# Patient Record
Sex: FEMALE | Race: WHITE | Employment: PART TIME | URBAN - METROPOLITAN AREA
[De-identification: names, ages, dates, MRNs, and addresses within clinical notes are randomized per-mention and may not be internally consistent; named-entity substitution may affect disease eponyms.]

---

## 2019-10-31 PROBLEM — Z00.00 ROUTINE ADULT HEALTH MAINTENANCE: Status: ACTIVE | Noted: 2017-02-14

## 2019-10-31 PROBLEM — L65.9 HAIR LOSS: Status: ACTIVE | Noted: 2018-07-30

## 2019-10-31 PROBLEM — R30.0 DYSURIA: Status: RESOLVED | Noted: 2017-02-14 | Resolved: 2019-10-31

## 2019-10-31 PROBLEM — Z00.00 ROUTINE ADULT HEALTH MAINTENANCE: Status: RESOLVED | Noted: 2017-02-14 | Resolved: 2019-10-31

## 2019-10-31 PROBLEM — R30.0 DYSURIA: Status: ACTIVE | Noted: 2017-02-14

## 2019-10-31 PROBLEM — L65.9 HAIR LOSS: Status: RESOLVED | Noted: 2018-07-30 | Resolved: 2019-10-31

## 2019-11-04 ENCOUNTER — HOSPITAL ENCOUNTER (OUTPATIENT)
Dept: GENERAL RADIOLOGY | Age: 24
Discharge: HOME | End: 2019-11-04
Payer: MEDICARE

## 2019-11-04 ENCOUNTER — HOSPITAL ENCOUNTER (OUTPATIENT)
Age: 24
Discharge: HOME | End: 2019-11-04
Payer: MEDICARE

## 2019-11-04 DIAGNOSIS — S69.91XA INJURY OF FINGER OF RIGHT HAND, INITIAL ENCOUNTER: ICD-10-CM

## 2019-11-04 PROCEDURE — 73120 X-RAY EXAM OF HAND: CPT

## 2019-12-09 ENCOUNTER — APPOINTMENT (OUTPATIENT)
Dept: ULTRASOUND IMAGING | Age: 24
End: 2019-12-09
Payer: MEDICAID

## 2019-12-09 ENCOUNTER — HOSPITAL ENCOUNTER (EMERGENCY)
Age: 24
Discharge: HOME | End: 2019-12-09
Payer: MEDICAID

## 2019-12-09 VITALS
TEMPERATURE: 98.1 F | WEIGHT: 160.72 LBS | OXYGEN SATURATION: 96 % | HEIGHT: 65 IN | SYSTOLIC BLOOD PRESSURE: 93 MMHG | HEART RATE: 77 BPM | DIASTOLIC BLOOD PRESSURE: 73 MMHG | BODY MASS INDEX: 26.78 KG/M2 | RESPIRATION RATE: 18 BRPM

## 2019-12-09 DIAGNOSIS — N94.6 MENSTRUAL CRAMPS: Primary | ICD-10-CM

## 2019-12-09 DIAGNOSIS — B37.31 CANDIDIASIS OF VAGINA: ICD-10-CM

## 2019-12-09 DIAGNOSIS — N83.202 LEFT OVARIAN CYST: ICD-10-CM

## 2019-12-09 LAB
A/G RATIO: 1.4 (ref 1.1–2.2)
ALBUMIN SERPL-MCNC: 4.2 G/DL (ref 3.4–5)
ALP BLD-CCNC: 38 U/L (ref 40–129)
ALT SERPL-CCNC: 19 U/L (ref 10–40)
ANION GAP SERPL CALCULATED.3IONS-SCNC: 11 MMOL/L (ref 3–16)
AST SERPL-CCNC: 15 U/L (ref 15–37)
BACTERIA WET PREP: ABNORMAL
BASOPHILS ABSOLUTE: 0 K/UL (ref 0–0.2)
BASOPHILS RELATIVE PERCENT: 0.3 %
BILIRUB SERPL-MCNC: 0.5 MG/DL (ref 0–1)
BILIRUBIN URINE: NEGATIVE
BLOOD, URINE: ABNORMAL
BUN BLDV-MCNC: 12 MG/DL (ref 7–20)
CALCIUM SERPL-MCNC: 9.5 MG/DL (ref 8.3–10.6)
CHLORIDE BLD-SCNC: 103 MMOL/L (ref 99–110)
CLARITY: CLEAR
CLUE CELLS: ABNORMAL
CO2: 23 MMOL/L (ref 21–32)
COLOR: YELLOW
CREAT SERPL-MCNC: 0.6 MG/DL (ref 0.6–1.1)
EOSINOPHILS ABSOLUTE: 0 K/UL (ref 0–0.6)
EOSINOPHILS RELATIVE PERCENT: 0.3 %
EPITHELIAL CELLS WET PREP: ABNORMAL
EPITHELIAL CELLS, UA: 1 /HPF (ref 0–5)
GFR AFRICAN AMERICAN: >60
GFR NON-AFRICAN AMERICAN: >60
GLOBULIN: 2.9 G/DL
GLUCOSE BLD-MCNC: 96 MG/DL (ref 70–99)
GLUCOSE URINE: NEGATIVE MG/DL
HCG QUALITATIVE: NEGATIVE
HCT VFR BLD CALC: 39 % (ref 36–48)
HEMOGLOBIN: 13.1 G/DL (ref 12–16)
HYALINE CASTS: 2 /LPF (ref 0–8)
KETONES, URINE: NEGATIVE MG/DL
LEUKOCYTE ESTERASE, URINE: NEGATIVE
LIPASE: 19 U/L (ref 13–60)
LYMPHOCYTES ABSOLUTE: 1.6 K/UL (ref 1–5.1)
LYMPHOCYTES RELATIVE PERCENT: 22.5 %
MCH RBC QN AUTO: 28.2 PG (ref 26–34)
MCHC RBC AUTO-ENTMCNC: 33.7 G/DL (ref 31–36)
MCV RBC AUTO: 83.7 FL (ref 80–100)
MICROSCOPIC EXAMINATION: YES
MONOCYTES ABSOLUTE: 0.5 K/UL (ref 0–1.3)
MONOCYTES RELATIVE PERCENT: 7 %
NEUTROPHILS ABSOLUTE: 4.9 K/UL (ref 1.7–7.7)
NEUTROPHILS RELATIVE PERCENT: 69.9 %
NITRITE, URINE: NEGATIVE
PDW BLD-RTO: 14.1 % (ref 12.4–15.4)
PH UA: 5.5 (ref 5–8)
PLATELET # BLD: 196 K/UL (ref 135–450)
PMV BLD AUTO: 8.5 FL (ref 5–10.5)
POTASSIUM REFLEX MAGNESIUM: 4.6 MMOL/L (ref 3.5–5.1)
PROTEIN UA: NEGATIVE MG/DL
RBC # BLD: 4.66 M/UL (ref 4–5.2)
RBC UA: 518 /HPF (ref 0–4)
RBC WET PREP: ABNORMAL
SODIUM BLD-SCNC: 137 MMOL/L (ref 136–145)
SOURCE WET PREP: ABNORMAL
SPECIFIC GRAVITY UA: 1.02 (ref 1–1.03)
TOTAL PROTEIN: 7.1 G/DL (ref 6.4–8.2)
TRICHOMONAS PREP: ABNORMAL
URINE REFLEX TO CULTURE: ABNORMAL
URINE TYPE: ABNORMAL
UROBILINOGEN, URINE: 0.2 E.U./DL
WBC # BLD: 7 K/UL (ref 4–11)
WBC UA: 1 /HPF (ref 0–5)
WBC WET PREP: ABNORMAL
YEAST WET PREP: ABNORMAL

## 2019-12-09 PROCEDURE — 2580000003 HC RX 258: Performed by: NURSE PRACTITIONER

## 2019-12-09 PROCEDURE — 99284 EMERGENCY DEPT VISIT MOD MDM: CPT

## 2019-12-09 PROCEDURE — 81001 URINALYSIS AUTO W/SCOPE: CPT

## 2019-12-09 PROCEDURE — 87591 N.GONORRHOEAE DNA AMP PROB: CPT

## 2019-12-09 PROCEDURE — 84703 CHORIONIC GONADOTROPIN ASSAY: CPT

## 2019-12-09 PROCEDURE — 96361 HYDRATE IV INFUSION ADD-ON: CPT

## 2019-12-09 PROCEDURE — 80053 COMPREHEN METABOLIC PANEL: CPT

## 2019-12-09 PROCEDURE — 87491 CHLMYD TRACH DNA AMP PROBE: CPT

## 2019-12-09 PROCEDURE — 87210 SMEAR WET MOUNT SALINE/INK: CPT

## 2019-12-09 PROCEDURE — 76830 TRANSVAGINAL US NON-OB: CPT

## 2019-12-09 PROCEDURE — 83690 ASSAY OF LIPASE: CPT

## 2019-12-09 PROCEDURE — 6360000002 HC RX W HCPCS: Performed by: NURSE PRACTITIONER

## 2019-12-09 PROCEDURE — 96374 THER/PROPH/DIAG INJ IV PUSH: CPT

## 2019-12-09 PROCEDURE — 85025 COMPLETE CBC W/AUTO DIFF WBC: CPT

## 2019-12-09 PROCEDURE — 76856 US EXAM PELVIC COMPLETE: CPT

## 2019-12-09 RX ORDER — 0.9 % SODIUM CHLORIDE 0.9 %
1000 INTRAVENOUS SOLUTION INTRAVENOUS ONCE
Status: COMPLETED | OUTPATIENT
Start: 2019-12-09 | End: 2019-12-09

## 2019-12-09 RX ORDER — FLUCONAZOLE 150 MG/1
150 TABLET ORAL ONCE
Qty: 2 TABLET | Refills: 0 | Status: SHIPPED | OUTPATIENT
Start: 2019-12-09 | End: 2019-12-09

## 2019-12-09 RX ORDER — KETOROLAC TROMETHAMINE 30 MG/ML
30 INJECTION, SOLUTION INTRAMUSCULAR; INTRAVENOUS ONCE
Status: COMPLETED | OUTPATIENT
Start: 2019-12-09 | End: 2019-12-09

## 2019-12-09 RX ORDER — IBUPROFEN 800 MG/1
800 TABLET ORAL EVERY 8 HOURS PRN
Qty: 30 TABLET | Refills: 0 | Status: SHIPPED | OUTPATIENT
Start: 2019-12-09 | End: 2021-01-14 | Stop reason: CLARIF

## 2019-12-09 RX ADMIN — SODIUM CHLORIDE 1000 ML: 9 INJECTION, SOLUTION INTRAVENOUS at 16:50

## 2019-12-09 RX ADMIN — KETOROLAC TROMETHAMINE 30 MG: 30 INJECTION, SOLUTION INTRAMUSCULAR at 18:44

## 2019-12-09 ASSESSMENT — PAIN DESCRIPTION - FREQUENCY
FREQUENCY: CONTINUOUS

## 2019-12-09 ASSESSMENT — ENCOUNTER SYMPTOMS
ABDOMINAL DISTENTION: 0
BACK PAIN: 0
NAUSEA: 1
CONSTIPATION: 0
BLOOD IN STOOL: 0
VOMITING: 0
COLOR CHANGE: 0
ABDOMINAL PAIN: 0
DIARRHEA: 0

## 2019-12-09 ASSESSMENT — PAIN DESCRIPTION - PROGRESSION
CLINICAL_PROGRESSION: NOT CHANGED
CLINICAL_PROGRESSION: NOT CHANGED
CLINICAL_PROGRESSION: GRADUALLY IMPROVING

## 2019-12-09 ASSESSMENT — PAIN DESCRIPTION - PAIN TYPE
TYPE: ACUTE PAIN

## 2019-12-09 ASSESSMENT — PAIN DESCRIPTION - ONSET: ONSET: ON-GOING

## 2019-12-09 ASSESSMENT — PAIN - FUNCTIONAL ASSESSMENT
PAIN_FUNCTIONAL_ASSESSMENT: PREVENTS OR INTERFERES SOME ACTIVE ACTIVITIES AND ADLS
PAIN_FUNCTIONAL_ASSESSMENT: ACTIVITIES ARE NOT PREVENTED
PAIN_FUNCTIONAL_ASSESSMENT: PREVENTS OR INTERFERES SOME ACTIVE ACTIVITIES AND ADLS
PAIN_FUNCTIONAL_ASSESSMENT: 0-10

## 2019-12-09 ASSESSMENT — PAIN DESCRIPTION - ORIENTATION
ORIENTATION: LOWER;MID
ORIENTATION: LOWER

## 2019-12-09 ASSESSMENT — PAIN DESCRIPTION - LOCATION
LOCATION: ABDOMEN

## 2019-12-09 ASSESSMENT — PAIN SCALES - GENERAL
PAINLEVEL_OUTOF10: 5
PAINLEVEL_OUTOF10: 8
PAINLEVEL_OUTOF10: 4
PAINLEVEL_OUTOF10: 4

## 2019-12-09 ASSESSMENT — PAIN DESCRIPTION - DESCRIPTORS
DESCRIPTORS: STABBING

## 2019-12-11 LAB
C TRACH DNA GENITAL QL NAA+PROBE: NEGATIVE
N. GONORRHOEAE DNA: NEGATIVE

## 2020-05-21 PROBLEM — E24.9 CUSHING SYNDROME (HCC): Status: ACTIVE | Noted: 2018-09-26

## 2020-07-07 ENCOUNTER — OFFICE VISIT (OUTPATIENT)
Dept: PSYCHOLOGY | Age: 25
End: 2020-07-07
Payer: MEDICAID

## 2020-07-07 PROCEDURE — 90791 PSYCH DIAGNOSTIC EVALUATION: CPT | Performed by: PSYCHOLOGIST

## 2020-07-07 ASSESSMENT — ANXIETY QUESTIONNAIRES
1. FEELING NERVOUS, ANXIOUS, OR ON EDGE: 3-NEARLY EVERY DAY
GAD7 TOTAL SCORE: 6
6. BECOMING EASILY ANNOYED OR IRRITABLE: 1-SEVERAL DAYS
7. FEELING AFRAID AS IF SOMETHING AWFUL MIGHT HAPPEN: 0-NOT AT ALL
4. TROUBLE RELAXING: 1-SEVERAL DAYS
5. BEING SO RESTLESS THAT IT IS HARD TO SIT STILL: 0-NOT AT ALL
2. NOT BEING ABLE TO STOP OR CONTROL WORRYING: 0-NOT AT ALL
3. WORRYING TOO MUCH ABOUT DIFFERENT THINGS: 1-SEVERAL DAYS

## 2020-07-07 ASSESSMENT — PATIENT HEALTH QUESTIONNAIRE - PHQ9
SUM OF ALL RESPONSES TO PHQ9 QUESTIONS 1 & 2: 0
1. LITTLE INTEREST OR PLEASURE IN DOING THINGS: 0
SUM OF ALL RESPONSES TO PHQ QUESTIONS 1-9: 0
SUM OF ALL RESPONSES TO PHQ QUESTIONS 1-9: 0
2. FEELING DOWN, DEPRESSED OR HOPELESS: 0

## 2020-07-07 NOTE — Clinical Note
Dr Terese Hill, Just curious. Yanelis Chen any thoughts on if you sense a cognitive delay with this patient? Mother mentioned she is not \"mentally\" her age but we discussed in terms of her being emotionally behind due to social anxiety. It was tough for me to assess this more, mother did too much talking.  Connie

## 2020-07-07 NOTE — PROGRESS NOTES
coherent  Associations    logical connections  Insight    Fair  Judgment    Intact  Orientation    oriented to person, place, time, and general circumstances  Memory    recent and remote memory intact  Attention/Concentration    intact  Morbid ideation No  Suicide Assessment    no suicidal ideation      History:    Medications:   Current Outpatient Medications   Medication Sig Dispense Refill    busPIRone (BUSPAR) 7.5 MG tablet Take 1 tablet by mouth 3 times daily as needed (anxiety) 90 tablet 0    nystatin (MYCOSTATIN) 691183 UNIT/GM powder Apply 3 times daily. 30 g 0    hydrocortisone 2.5 % cream Apply topically 2 times daily. 20 g 0    aluminum chloride (DRYSOL) 20 % external solution Apply topically nightly. 35 mL 5    escitalopram (LEXAPRO) 10 MG tablet TAKE ONE TABLET BY MOUTH DAILY 30 tablet 0    ibuprofen (ADVIL;MOTRIN) 800 MG tablet Take 1 tablet by mouth every 8 hours as needed for Pain 30 tablet 0    naproxen (NAPROSYN) 250 MG tablet Take 1 tablet by mouth 2 times daily (with meals) 30 tablet 0    omeprazole (PRILOSEC) 20 MG capsule Take 20 mg by mouth daily.  traMADol (ULTRAM) 50 MG tablet Take 50 mg by mouth as needed.  MedroxyPROGESTERone Acetate (DEPO-PROVERA IM) Inject  into the muscle.  acetaminophen (TYLENOL) 100 MG/ML solution Take 10 mg/kg by mouth every 4 hours as needed. No current facility-administered medications for this visit.         Social History:   Social History     Socioeconomic History    Marital status: Single     Spouse name: Not on file    Number of children: Not on file    Years of education: Not on file    Highest education level: Not on file   Occupational History    Not on file   Social Needs    Financial resource strain: Not very hard    Food insecurity     Worry: Never true     Inability: Never true    Transportation needs     Medical: No     Non-medical: No   Tobacco Use    Smoking status: Never Smoker    Smokeless tobacco: Never Used Substance and Sexual Activity    Alcohol use: No    Drug use: No    Sexual activity: Not on file   Lifestyle    Physical activity     Days per week: Not on file     Minutes per session: Not on file    Stress: Not on file   Relationships    Social connections     Talks on phone: Not on file     Gets together: Not on file     Attends Rastafarian service: Not on file     Active member of club or organization: Not on file     Attends meetings of clubs or organizations: Not on file     Relationship status: Not on file    Intimate partner violence     Fear of current or ex partner: Not on file     Emotionally abused: Not on file     Physically abused: Not on file     Forced sexual activity: Not on file   Other Topics Concern    Not on file   Social History Narrative    Not on file       TOBACCO:   reports that she has never smoked. She has never used smokeless tobacco.  ETOH:   reports no history of alcohol use. Family History:   Family History   Problem Relation Age of Onset    Cancer Maternal Grandmother     Cancer Maternal Grandfather     Hypertension Maternal Grandfather     Arthritis Maternal Grandfather      A:    Pt presenting with chronic anxiety disorder since  in the Social phobia and Panic disorder spectrum. Mother here to support pt hoping that pt will be open to more aggressive anxiety treatment outside medication only which has been the only modality of treatment since . Much of our time focused on gathering historical information in an effort to build rapport and to provide psych-ed about the benefits of psychotherapy. Weighed the pros and cons of treatment. By end of consult, pt agreed to \"full dose\" psychotherapy, referrals provided today. Pt will call to set up new patient therapy appointment over the next few days.      Mother has some question as to whether there is a developmental delay but often with anxiety of this nature, gets in way of social development so will present often as not being as emotionally developed as their chronological age. Agreed to f/u with me in 6 weeks with the goal that pt will hopefully have met with new therapist 1-2 x. Because she has little to no psychotherapy experience she is at a high risk for dropping out of treatment so this f/u will hopefully support her to stay engaged. Psych med: pt reported that buspirone helps. Takes 1 x daily and will take 2nd dose if feeling anxious with visitors or what not. Diagnosis:    Social phobia, Panic disorder       Diagnosis Date    Anxiety     Depression     Gallbladder polyp      Problems with primary support group and Problems related to the social environment    Safety: denied any si/hi risk. No hx psych admissions. No hx suicide attempts/gestures. No hx substance abuse tx. Plan:  Pt interventions:    Practiced assertive communication, Trained in strategies for increasing balanced thinking, Discussed self-care (sleep, nutrition, rewarding activities, social support, exercise), Discussed benefits of referral for specialty care, Motivational Interviewing to determine importance and readiness for change, Discussed potential barriers to change, Established rapport, Conducted functional assessment and Supportive techniques    Pt Behavioral Change Plan:    1. Consider [address] [place] [address] for individual psychotherapy:    [address] [place] [address]; [facility] ([address]  [phone] for general information  [phone] for Open Access hours    2. [facility]:    [address]   [address]   [phone]       [facility].. com/us/therapists/[email or web address]-[place]-oh/[id number]    3. Dr Tatiana Akers will consult with Dr Fadi Phillips for Buspar 7.5 mg  4.  Return to clinic for Dr Tatiana Akers in 6 weeks

## 2021-01-14 PROBLEM — E24.9 CUSHING SYNDROME (HCC): Status: RESOLVED | Noted: 2018-09-26 | Resolved: 2021-01-14

## 2021-01-16 ENCOUNTER — HOSPITAL ENCOUNTER (OUTPATIENT)
Dept: ULTRASOUND IMAGING | Age: 26
Discharge: HOME | End: 2021-01-16
Payer: MEDICAID

## 2021-01-16 DIAGNOSIS — R10.11 RUQ PAIN: ICD-10-CM

## 2021-01-16 PROCEDURE — 76705 ECHO EXAM OF ABDOMEN: CPT

## 2021-01-22 ENCOUNTER — HOSPITAL ENCOUNTER (OUTPATIENT)
Dept: NUCLEAR MEDICINE | Age: 26
Discharge: HOME | End: 2021-01-22
Payer: MEDICAID

## 2021-01-22 DIAGNOSIS — K80.20 CALCULUS OF GALLBLADDER WITHOUT CHOLECYSTITIS WITHOUT OBSTRUCTION: ICD-10-CM

## 2021-01-22 DIAGNOSIS — R10.11 RUQ PAIN: ICD-10-CM

## 2021-01-22 PROCEDURE — 78227 HEPATOBIL SYST IMAGE W/DRUG: CPT

## 2021-01-22 PROCEDURE — 3430000000 HC RX DIAGNOSTIC RADIOPHARMACEUTICAL: Performed by: INTERNAL MEDICINE

## 2021-01-22 PROCEDURE — A9537 TC99M MEBROFENIN: HCPCS | Performed by: INTERNAL MEDICINE

## 2021-01-22 RX ADMIN — Medication 6 MILLICURIE: at 10:46

## 2021-02-03 ENCOUNTER — INITIAL CONSULT (OUTPATIENT)
Dept: SURGERY | Age: 26
End: 2021-02-03
Payer: MEDICAID

## 2021-02-03 VITALS — WEIGHT: 168 LBS | BODY MASS INDEX: 27.96 KG/M2 | DIASTOLIC BLOOD PRESSURE: 68 MMHG | SYSTOLIC BLOOD PRESSURE: 96 MMHG

## 2021-02-03 DIAGNOSIS — R22.42 LEG MASS, LEFT: Primary | ICD-10-CM

## 2021-02-03 PROCEDURE — G8427 DOCREV CUR MEDS BY ELIG CLIN: HCPCS | Performed by: SURGERY

## 2021-02-03 PROCEDURE — G8482 FLU IMMUNIZE ORDER/ADMIN: HCPCS | Performed by: SURGERY

## 2021-02-03 PROCEDURE — 99243 OFF/OP CNSLTJ NEW/EST LOW 30: CPT | Performed by: SURGERY

## 2021-02-03 PROCEDURE — G8419 CALC BMI OUT NRM PARAM NOF/U: HCPCS | Performed by: SURGERY

## 2021-02-03 ASSESSMENT — ENCOUNTER SYMPTOMS
NAUSEA: 1
ABDOMINAL PAIN: 1

## 2021-02-03 NOTE — PROGRESS NOTES
Subjective:      Patient ID: Ronn Rutledge is a 30 y.o. female. HPI  Chief Complaint: leg cyst  Patient referred by Dr. Jose Ruffin for evaluation of cyst vs lipoma. Patient reports symptoms of growth, tendency to be traumatized, pain. Location of symptoms is left anterior thigh. Symptoms were first noted 10 years ago but getting larger. Also reports chronic RUQ pain but now located in LLQ. No association with meals. Some nausea. No change in bowel habits. Some bloating. RUQ u/S with polyps. HIDA with normal EF. Has seen Dr. Owen Mason in past.  Patient has a history of anxiety. History left adnexal cyst 12/2019. No previous surgeries. Will plan following treatment: excision left thigh mass. Referral to GI for EGD        Past Medical History:   Diagnosis Date    Anxiety     Depression     Gallbladder polyp        Past Surgical History:   Procedure Laterality Date    ESOPHAGOSCOPY         No current outpatient medications on file. No current facility-administered medications for this visit.         Prior to Admission medications    Not on File         Allergies   Allergen Reactions    Azithromycin      Nausea         Social History     Socioeconomic History    Marital status: Single     Spouse name: Not on file    Number of children: Not on file    Years of education: Not on file    Highest education level: Not on file   Occupational History    Not on file   Social Needs    Financial resource strain: Not very hard    Food insecurity     Worry: Never true     Inability: Never true    Transportation needs     Medical: No     Non-medical: No   Tobacco Use    Smoking status: Never Smoker    Smokeless tobacco: Never Used   Substance and Sexual Activity    Alcohol use: No    Drug use: No    Sexual activity: Not on file   Lifestyle    Physical activity     Days per week: Not on file     Minutes per session: Not on file    Stress: Not on file   Relationships    Social connections Talks on phone: Not on file     Gets together: Not on file     Attends Scientology service: Not on file     Active member of club or organization: Not on file     Attends meetings of clubs or organizations: Not on file     Relationship status: Not on file    Intimate partner violence     Fear of current or ex partner: Not on file     Emotionally abused: Not on file     Physically abused: Not on file     Forced sexual activity: Not on file   Other Topics Concern    Not on file   Social History Narrative    Not on file       Family History   Problem Relation Age of Onset    Cancer Maternal Grandmother     Cancer Maternal Grandfather     Hypertension Maternal Grandfather     Arthritis Maternal Grandfather        Review of Systems   Constitutional: Negative. Gastrointestinal: Positive for abdominal pain and nausea. Skin: Negative. Hematological: Negative. All other systems reviewed and are negative. Objective:   Physical Exam  Vitals signs reviewed. Constitutional:       General: She is not in acute distress. Appearance: She is well-developed. She is not diaphoretic. HENT:      Head: Normocephalic and atraumatic. Right Ear: External ear normal.      Left Ear: External ear normal.      Nose: Nose normal.   Eyes:      General: No scleral icterus. Conjunctiva/sclera: Conjunctivae normal.   Neck:      Musculoskeletal: Normal range of motion and neck supple. Cardiovascular:      Rate and Rhythm: Normal rate and regular rhythm. Heart sounds: Normal heart sounds. Pulmonary:      Effort: Pulmonary effort is normal. No respiratory distress. Breath sounds: Normal breath sounds. No wheezing. Abdominal:      General: There is no distension. Palpations: Abdomen is soft. Tenderness: There is no abdominal tenderness. Musculoskeletal: Normal range of motion. Skin:     General: Skin is warm and dry. Findings: No erythema. Comments: 3 cm soft, mobile SQ mass left anterior thigh   Neurological:      Mental Status: She is alert and oriented to person, place, and time. Psychiatric:         Behavior: Behavior normal.         Thought Content: Thought content normal.         Judgment: Judgment normal.         Assessment:       Diagnosis Orders   1. Leg mass, left             Plan:      Excision left leg mass  The risks, benefits and alternatives to the planned procedure were discussed. Patient expressed an understanding and is willing to proceed. Will need Covid preop  Discussed need for EGD with Dr. Geovanna Mejias given chronic abdominal complaints.   If negative then can re-eval GB polyps as potential cause of abdominal pain but less likely at this time        Leann Kingston MD

## 2021-02-03 NOTE — LETTER
CONSENT TO OPERATION      Excision of left thigh mass      PATIENT : Nav Lynch  YOB: 1995             DATE : 2/3/21     1. I request and consent that Dr. Delfin Trimble and/or his associates or assistants perform an operation and/or procedures on the above patient at [facility], to treat the condition(s) which appear indicated by the diagnostic studies already performed. 2. It has been explained to me by the informing physician that during the course of the operation and/or procedure(s) unforeseen conditions may be revealed that necessitate an extension of the original operation and/or procedure(s) or different operation and/or procedures than those set forth in Paragraph 1. I therefore authorize and request that my physician and/or his associates or assistants perform such operations and/or procedures as are necessary and desirable in the exercise of professional judgment. The authority granted under this Paragraph 2 shall extend to all conditions that require treatment and are known to my physician at the time the operation is commenced. 3. I have been made aware by the informing physician of certain risks and consequences that are associated with the operation and/or procedure(s) described in Paragraph 1, the reasonable alternative methods or treatment, the possible consequences, the possibility that the operation and/or procedure(s) may be unsuccessful and the possibility of complications. I understand the reasonably known risks to be:  ? Bleeding  ? Infection  ? Poor Healing  ? Possible Damage to Nerve, Vessel, Tendon/Muscle or Bone  ? Need for further Treatment/Surgery  ? Stiffness  ? Pain  ? Residual or Recurrent Symptoms  ?  Anesthetic and/or Medical Risks

## 2021-02-04 ENCOUNTER — HOSPITAL ENCOUNTER (EMERGENCY)
Age: 26
Discharge: HOME | End: 2021-02-04
Attending: EMERGENCY MEDICINE
Payer: MEDICAID

## 2021-02-04 VITALS
SYSTOLIC BLOOD PRESSURE: 137 MMHG | RESPIRATION RATE: 16 BRPM | HEIGHT: 65 IN | BODY MASS INDEX: 27.99 KG/M2 | TEMPERATURE: 98.6 F | HEART RATE: 90 BPM | WEIGHT: 168 LBS | DIASTOLIC BLOOD PRESSURE: 90 MMHG | OXYGEN SATURATION: 97 %

## 2021-02-04 DIAGNOSIS — N30.00 ACUTE CYSTITIS WITHOUT HEMATURIA: Primary | ICD-10-CM

## 2021-02-04 LAB
BACTERIA: ABNORMAL /HPF
BILIRUBIN URINE: NEGATIVE
BLOOD, URINE: NEGATIVE
CLARITY: ABNORMAL
COLOR: YELLOW
EPITHELIAL CELLS, UA: 15 /HPF (ref 0–5)
GLUCOSE URINE: NEGATIVE MG/DL
HCG(URINE) PREGNANCY TEST: NEGATIVE
HYALINE CASTS: 10 /LPF (ref 0–8)
KETONES, URINE: NEGATIVE MG/DL
LEUKOCYTE ESTERASE, URINE: ABNORMAL
MICROSCOPIC EXAMINATION: YES
NITRITE, URINE: NEGATIVE
PH UA: 6 (ref 5–8)
PROTEIN UA: NEGATIVE MG/DL
RBC UA: 3 /HPF (ref 0–4)
SPECIFIC GRAVITY UA: 1.02 (ref 1–1.03)
URINE REFLEX TO CULTURE: ABNORMAL
URINE TYPE: ABNORMAL
UROBILINOGEN, URINE: 0.2 E.U./DL
WBC UA: 5 /HPF (ref 0–5)

## 2021-02-04 PROCEDURE — 99283 EMERGENCY DEPT VISIT LOW MDM: CPT

## 2021-02-04 PROCEDURE — 6360000002 HC RX W HCPCS: Performed by: EMERGENCY MEDICINE

## 2021-02-04 PROCEDURE — 81001 URINALYSIS AUTO W/SCOPE: CPT

## 2021-02-04 PROCEDURE — 6370000000 HC RX 637 (ALT 250 FOR IP): Performed by: EMERGENCY MEDICINE

## 2021-02-04 PROCEDURE — 96372 THER/PROPH/DIAG INJ SC/IM: CPT

## 2021-02-04 PROCEDURE — 84703 CHORIONIC GONADOTROPIN ASSAY: CPT

## 2021-02-04 RX ORDER — KETOROLAC TROMETHAMINE 30 MG/ML
15 INJECTION, SOLUTION INTRAMUSCULAR; INTRAVENOUS ONCE
Status: COMPLETED | OUTPATIENT
Start: 2021-02-04 | End: 2021-02-04

## 2021-02-04 RX ORDER — CEPHALEXIN 500 MG/1
500 CAPSULE ORAL 2 TIMES DAILY
Qty: 28 CAPSULE | Refills: 0 | Status: SHIPPED | OUTPATIENT
Start: 2021-02-04 | End: 2021-02-11

## 2021-02-04 RX ORDER — CEPHALEXIN 500 MG/1
500 CAPSULE ORAL ONCE
Status: COMPLETED | OUTPATIENT
Start: 2021-02-04 | End: 2021-02-04

## 2021-02-04 RX ORDER — NAPROXEN 500 MG/1
500 TABLET ORAL 2 TIMES DAILY WITH MEALS
Qty: 60 TABLET | Refills: 5 | Status: ON HOLD
Start: 2021-02-04 | End: 2021-10-20 | Stop reason: HOSPADM

## 2021-02-04 RX ORDER — PHENAZOPYRIDINE HYDROCHLORIDE 100 MG/1
100 TABLET, FILM COATED ORAL 3 TIMES DAILY PRN
Qty: 20 TABLET | Refills: 0 | Status: SHIPPED | OUTPATIENT
Start: 2021-02-04 | End: 2021-02-07

## 2021-02-04 RX ADMIN — KETOROLAC TROMETHAMINE 15 MG: 30 INJECTION, SOLUTION INTRAMUSCULAR at 04:28

## 2021-02-04 RX ADMIN — CEPHALEXIN 500 MG: 500 CAPSULE ORAL at 04:28

## 2021-02-04 ASSESSMENT — ENCOUNTER SYMPTOMS
NAUSEA: 0
COUGH: 0
ABDOMINAL PAIN: 1
PHOTOPHOBIA: 0
SHORTNESS OF BREATH: 0
TROUBLE SWALLOWING: 0
VOMITING: 0
COLOR CHANGE: 0

## 2021-02-04 ASSESSMENT — PAIN SCALES - GENERAL: PAINLEVEL_OUTOF10: 5

## 2021-02-04 ASSESSMENT — PAIN DESCRIPTION - FREQUENCY: FREQUENCY: INTERMITTENT

## 2021-02-04 NOTE — ED PROVIDER NOTES
[facility]  EMERGENCY DEPARTMENTENCOUNTER      Pt Name: Vanna Qureshi  MRN: [id number]  [place] 1995  Date ofevaluation: 2/4/2021  Provider: Bárbara Mcguire MD    CHIEF COMPLAINT       Chief Complaint   Patient presents with    Abdominal Pain     pt came in with left lower abd pain for the last month states shes had different test done but concerned about the amount of pain she had         HISTORY OF PRESENT ILLNESS   (Location/Symptom, Timing/Onset,Context/Setting, Quality, Duration, Modifying Factors, Severity)  Note limiting factors. Vanna Qureshi is a 30 y.o. female  who  has a past medical history of Anxiety, Depression, and Gallbladder polyp. who presents to the emergency department for evaluation of left lower quadrant abdominal pain. Patient reports intermittent pains over the past few weeks which got acutely worse over the past few days. Patient has been receiving recent work-ups for epigastric upper quadrant abdominal pain. She states the pain she is current experiencing is similar to ovarian cyst she is had the past.  She denies vaginal bleeding or discharge or recent unprotected sexual encounters. She does report dysuria and urinary frequency. She states her last menstrual period was approximate 2 weeks ago and was a prolonged duration. She does have a history of abnormal periods. She denies fevers. Denies changes in your bowel function. She has not taken medications for symptoms. HPI    NursingNotes were reviewed. REVIEW OF SYSTEMS    (2-9 systems for level 4, 10 or more for level 5)     Review of Systems   Constitutional: Negative for activity change, fatigue and fever. HENT: Negative for congestion, mouth sores and trouble swallowing. Eyes: Negative for photophobia and visual disturbance. Respiratory: Negative for cough and shortness of breath. Cardiovascular: Negative for chest pain and palpitations.  Social connections     Talks on phone: Not on file     Gets together: Not on file     Attends Caodaism service: Not on file     Active member of club or organization: Not on file     Attends meetings of clubs or organizations: Not on file     Relationship status: Not on file    Intimate partner violence     Fear of current or ex partner: Not on file     Emotionally abused: Not on file     Physically abused: Not on file     Forced sexual activity: Not on file   Other Topics Concern    Not on file   Social History Narrative    Not on file       SCREENINGS             PHYSICAL EXAM    (up to 7 for level 4, 8 or more for level 5)     ED Triage Vitals [02/04/21 0351]   BP Temp Temp Source Pulse Resp SpO2 Height Weight   (!) 137/90 98.6 °F (37 °C) Oral 90 16 97 % 5' 5\" (1.651 m) 168 lb (76.2 kg)       Physical Exam  Vitals signs and nursing note reviewed. Constitutional:       Appearance: She is well-developed. HENT:      Head: Normocephalic and atraumatic. Eyes:      Conjunctiva/sclera: Conjunctivae normal.      Pupils: Pupils are equal, round, and reactive to light. Neck:      Musculoskeletal: Normal range of motion. Trachea: No tracheal deviation. Cardiovascular:      Rate and Rhythm: Normal rate and regular rhythm. Heart sounds: Normal heart sounds. Pulmonary:      Effort: Pulmonary effort is normal.      Breath sounds: Normal breath sounds. Abdominal:      General: There is no distension. Palpations: Abdomen is soft. Tenderness: There is no abdominal tenderness. There is no right CVA tenderness, left CVA tenderness, guarding or rebound. Negative signs include Burnette's sign and Rovsing's sign. Musculoskeletal: Normal range of motion. Skin:     General: Skin is warm and dry. Neurological:      Mental Status: She is alert and oriented to person, place, and time.          RESULTS EKG: All EKG's are interpreted by the Emergency Department Physician who either signs or Co-signsthis chart in the absence of a cardiologist.      RADIOLOGY:   Sarai Maxim such as CT, Ultrasound and MRI are read by the radiologist. Gina Meredith radiographic images are visualized and preliminarily interpreted by the emergency physician with the below findings:      Interpretation per the Radiologist below, if available at the time ofthis note:    No orders to display         ED BEDSIDE ULTRASOUND:   Performed by ED Physician - none    LABS:  Labs Reviewed   URINE RT REFLEX TO CULTURE - Abnormal; Notable for the following components:       Result Value    Clarity, UA CLOUDY (*)     Leukocyte Esterase, Urine TRACE (*)     All other components within normal limits    Narrative:     Performed at:  [address] [employer] [address]   Phone [phone]   MICROSCOPIC URINALYSIS - Abnormal; Notable for the following components:    Bacteria, UA 4+ (*)     Hyaline Casts, UA 10 (*)     Epithelial Cells, UA 15 (*)     All other components within normal limits    Narrative:     Performed at:  [address] [employer] [address]   Phone [phone]   PREGNANCY, URINE    Narrative:     Performed at:  [address] [employer] [address]   Phone [phone]       All other labs were within normal range or not returned as of this dictation.     EMERGENCY DEPARTMENT COURSE and DIFFERENTIAL DIAGNOSIS/MDM:   Vitals:    Vitals:    02/04/21 0351   BP: (!) 137/90   Pulse: 90   Resp: 16   Temp: 98.6 °F (37 °C)   TempSrc: Oral   SpO2: 97%   Weight: 168 lb (76.2 kg)   Height: 5' 5\" (1.651 m)       Patient was given thefollowing medications:  Medications   ketorolac (TORADOL) injection 15 mg (15 mg Intramuscular Given 2/4/21 2084) cephALEXin (KEFLEX) capsule 500 mg (500 mg Oral Given 2/4/21 0428)       ED COURSE & MEDICAL DECISION MAKING    Pertinent Labs & Imaging studies reviewed. (See chart for details)   -  Patient seen and evaluated in the emergency department. -  Triage and nursing notes reviewed and incorporated. -  Old chart records reviewed and incorporated. -  Differential diagnosis includes: Differential diagnosis: Abdominal Aortic Aneurysm, Ischemic Bowel, Bowel Obstruction, Appendicitis, Diverticulitis, Pyelonephritis, UTI, STD, Ureterolithiasis, Colitis, Gonad Torsion, other    -  Work-up included:  See above  -  ED treatment included: See above  -  Results discussed with patient. Labs show urinalysis that appears to be infected. Abdominal exam is benign she has no rebound or guarding or significant tenderness on exam.  Denies vaginal bleeding or discharge. Symptoms can be explained by the patient's abnormally low urine and she will be treated symptomatically. Patient feels improved on reevaluation. Symptomatic treatment with expectant management discussed with the patient and the amenable to treatment plan and outpatient follow-up. Strict return precautions were discussed with the patient. They demonstrated understanding of when to return to the emergency department for new or worsening symptoms. .  The patient is agreeable with plan of care and disposition. REASSESSMENT          CRITICAL CARE TIME   Total Critical Care time was 0 minutes, excluding separatelyreportable procedures. There was a high probability ofclinically significant/life threatening deterioration in the patient's condition which required my urgent intervention. CONSULTS:  None    PROCEDURES:  Unless otherwise noted below, none     Procedures    FINAL IMPRESSION      1.  Acute cystitis without hematuria          DISPOSITION/PLAN   DISPOSITION Decision To Discharge 02/04/2021 04:20:40 AM      PATIENT REFERREDTO:  Fara Gilmore DO [address]   [phone]    Schedule an appointment as soon as possible for a visit   As needed      DISCHARGEMEDICATIONS:  Discharge Medication List as of 2/4/2021  4:34 AM      START taking these medications    Details   cephALEXin (KEFLEX) 500 MG capsule Take 1 capsule by mouth 2 times daily for 7 days, Disp-28 capsule, R-0Normal      phenazopyridine (PYRIDIUM) 100 MG tablet Take 1 tablet by mouth 3 times daily as needed for Pain, Disp-20 tablet, R-0Normal      naproxen (NAPROSYN) 500 MG tablet Take 1 tablet by mouth 2 times daily (with meals), Disp-60 tablet, R-5Normal                (Please note that portions of this note were completed with a voice recognition program.  Efforts were made to edit the dictations but occasionally words are mis-transcribed.)    Rk Purcell MD (electronically signed)  Attending Emergency Physician          Rk Purcell MD  02/04/21 0648

## 2021-02-04 NOTE — ED TRIAGE NOTES
Pt states she has been having LLQ for 2 months and tonight the pain has increased. Pt had an outpatient CT for her gallbladder 2 weeks ago and was inconclusive.

## 2021-02-25 ENCOUNTER — HOSPITAL ENCOUNTER (OUTPATIENT)
Dept: ULTRASOUND IMAGING | Age: 26
Discharge: HOME | End: 2021-02-25
Payer: MEDICAID

## 2021-02-25 DIAGNOSIS — G89.29 CHRONIC RLQ PAIN: ICD-10-CM

## 2021-02-25 DIAGNOSIS — R10.31 CHRONIC RLQ PAIN: ICD-10-CM

## 2021-02-25 PROCEDURE — 76856 US EXAM PELVIC COMPLETE: CPT

## 2021-02-25 PROCEDURE — 76830 TRANSVAGINAL US NON-OB: CPT

## 2021-04-08 ENCOUNTER — IMMUNIZATION (OUTPATIENT)
Dept: PRIMARY CARE CLINIC | Age: 26
End: 2021-04-08
Payer: MEDICAID

## 2021-04-08 PROCEDURE — 91301 COVID-19, MODERNA VACCINE 100MCG/0.5ML DOSE: CPT | Performed by: FAMILY MEDICINE

## 2021-04-08 PROCEDURE — 0011A COVID-19, MODERNA VACCINE 100MCG/0.5ML DOSE: CPT | Performed by: FAMILY MEDICINE

## 2021-05-06 ENCOUNTER — IMMUNIZATION (OUTPATIENT)
Dept: PRIMARY CARE CLINIC | Age: 26
End: 2021-05-06
Payer: MEDICAID

## 2021-05-06 PROCEDURE — 91301 COVID-19, MODERNA VACCINE 100MCG/0.5ML DOSE: CPT | Performed by: FAMILY MEDICINE

## 2021-05-06 PROCEDURE — 0012A COVID-19, MODERNA VACCINE 100MCG/0.5ML DOSE: CPT | Performed by: FAMILY MEDICINE

## 2021-10-19 ENCOUNTER — HOSPITAL ENCOUNTER (OUTPATIENT)
Age: 26
Setting detail: OBSERVATION
Discharge: HOME | End: 2021-10-20
Attending: SURGERY | Admitting: SURGERY
Payer: MEDICAID

## 2021-10-19 ENCOUNTER — APPOINTMENT (OUTPATIENT)
Dept: CT IMAGING | Age: 26
End: 2021-10-19
Payer: MEDICAID

## 2021-10-19 ENCOUNTER — APPOINTMENT (OUTPATIENT)
Dept: ULTRASOUND IMAGING | Age: 26
End: 2021-10-19
Payer: MEDICAID

## 2021-10-19 DIAGNOSIS — K35.80 ACUTE APPENDICITIS, UNSPECIFIED ACUTE APPENDICITIS TYPE: Primary | ICD-10-CM

## 2021-10-19 DIAGNOSIS — K35.30 ACUTE APPENDICITIS WITH LOCALIZED PERITONITIS, WITHOUT PERFORATION, ABSCESS, OR GANGRENE: ICD-10-CM

## 2021-10-19 LAB
A/G RATIO: 1.6 (ref 1.1–2.2)
ALBUMIN SERPL-MCNC: 4.4 G/DL (ref 3.4–5)
ALP BLD-CCNC: 55 U/L (ref 40–129)
ALT SERPL-CCNC: 12 U/L (ref 10–40)
ANION GAP SERPL CALCULATED.3IONS-SCNC: 10 MMOL/L (ref 3–16)
AST SERPL-CCNC: 14 U/L (ref 15–37)
BACTERIA: ABNORMAL /HPF
BASOPHILS ABSOLUTE: 0 K/UL (ref 0–0.2)
BASOPHILS RELATIVE PERCENT: 0.2 %
BILIRUB SERPL-MCNC: 0.5 MG/DL (ref 0–1)
BILIRUBIN URINE: NEGATIVE
BLOOD, URINE: ABNORMAL
BUN BLDV-MCNC: 12 MG/DL (ref 7–20)
CALCIUM SERPL-MCNC: 9 MG/DL (ref 8.3–10.6)
CHLORIDE BLD-SCNC: 108 MMOL/L (ref 99–110)
CLARITY: ABNORMAL
CO2: 23 MMOL/L (ref 21–32)
COLOR: YELLOW
COMMENT UA: ABNORMAL
CREAT SERPL-MCNC: 0.8 MG/DL (ref 0.6–1.1)
EOSINOPHILS ABSOLUTE: 0 K/UL (ref 0–0.6)
EOSINOPHILS RELATIVE PERCENT: 0.3 %
EPITHELIAL CELLS, UA: 10 /HPF (ref 0–5)
GFR AFRICAN AMERICAN: >60
GFR NON-AFRICAN AMERICAN: >60
GLOBULIN: 2.7 G/DL
GLUCOSE BLD-MCNC: 132 MG/DL (ref 70–99)
GLUCOSE URINE: NEGATIVE MG/DL
HCG QUALITATIVE: NEGATIVE
HCT VFR BLD CALC: 38 % (ref 36–48)
HEMOGLOBIN: 12.3 G/DL (ref 12–16)
HYALINE CASTS: 3 /LPF (ref 0–8)
KETONES, URINE: ABNORMAL MG/DL
LEUKOCYTE ESTERASE, URINE: NEGATIVE
LIPASE: 65 U/L (ref 13–60)
LYMPHOCYTES ABSOLUTE: 1.9 K/UL (ref 1–5.1)
LYMPHOCYTES RELATIVE PERCENT: 27.5 %
MCH RBC QN AUTO: 27.1 PG (ref 26–34)
MCHC RBC AUTO-ENTMCNC: 32.5 G/DL (ref 31–36)
MCV RBC AUTO: 83.4 FL (ref 80–100)
MICROSCOPIC EXAMINATION: YES
MONOCYTES ABSOLUTE: 0.4 K/UL (ref 0–1.3)
MONOCYTES RELATIVE PERCENT: 5.1 %
NEUTROPHILS ABSOLUTE: 4.7 K/UL (ref 1.7–7.7)
NEUTROPHILS RELATIVE PERCENT: 66.9 %
NITRITE, URINE: NEGATIVE
PDW BLD-RTO: 14.1 % (ref 12.4–15.4)
PH UA: 5.5 (ref 5–8)
PLATELET # BLD: 228 K/UL (ref 135–450)
PMV BLD AUTO: 8.6 FL (ref 5–10.5)
POTASSIUM REFLEX MAGNESIUM: 4.2 MMOL/L (ref 3.5–5.1)
PROTEIN UA: NEGATIVE MG/DL
RBC # BLD: 4.55 M/UL (ref 4–5.2)
RBC UA: 1 /HPF (ref 0–4)
SARS-COV-2, NAAT: NOT DETECTED
SODIUM BLD-SCNC: 141 MMOL/L (ref 136–145)
SPECIFIC GRAVITY UA: 1.02 (ref 1–1.03)
TOTAL PROTEIN: 7.1 G/DL (ref 6.4–8.2)
URINE REFLEX TO CULTURE: ABNORMAL
URINE TYPE: ABNORMAL
UROBILINOGEN, URINE: 0.2 E.U./DL
WBC # BLD: 7 K/UL (ref 4–11)
WBC UA: 3 /HPF (ref 0–5)

## 2021-10-19 PROCEDURE — 85025 COMPLETE CBC W/AUTO DIFF WBC: CPT

## 2021-10-19 PROCEDURE — 87635 SARS-COV-2 COVID-19 AMP PRB: CPT

## 2021-10-19 PROCEDURE — 96375 TX/PRO/DX INJ NEW DRUG ADDON: CPT

## 2021-10-19 PROCEDURE — G0378 HOSPITAL OBSERVATION PER HR: HCPCS

## 2021-10-19 PROCEDURE — 83690 ASSAY OF LIPASE: CPT

## 2021-10-19 PROCEDURE — 80053 COMPREHEN METABOLIC PANEL: CPT

## 2021-10-19 PROCEDURE — 76830 TRANSVAGINAL US NON-OB: CPT

## 2021-10-19 PROCEDURE — 96374 THER/PROPH/DIAG INJ IV PUSH: CPT

## 2021-10-19 PROCEDURE — 74177 CT ABD & PELVIS W/CONTRAST: CPT

## 2021-10-19 PROCEDURE — 99284 EMERGENCY DEPT VISIT MOD MDM: CPT

## 2021-10-19 PROCEDURE — 81001 URINALYSIS AUTO W/SCOPE: CPT

## 2021-10-19 PROCEDURE — 2580000003 HC RX 258: Performed by: PHYSICIAN ASSISTANT

## 2021-10-19 PROCEDURE — 84703 CHORIONIC GONADOTROPIN ASSAY: CPT

## 2021-10-19 PROCEDURE — 2500000003 HC RX 250 WO HCPCS: Performed by: PHYSICIAN ASSISTANT

## 2021-10-19 PROCEDURE — 76856 US EXAM PELVIC COMPLETE: CPT

## 2021-10-19 PROCEDURE — 6360000002 HC RX W HCPCS: Performed by: PHYSICIAN ASSISTANT

## 2021-10-19 PROCEDURE — 6360000004 HC RX CONTRAST MEDICATION: Performed by: PHYSICIAN ASSISTANT

## 2021-10-19 RX ORDER — ACETAMINOPHEN 325 MG/1
650 TABLET ORAL EVERY 4 HOURS PRN
Status: DISCONTINUED | OUTPATIENT
Start: 2021-10-19 | End: 2021-10-20 | Stop reason: HOSPADM

## 2021-10-19 RX ORDER — SODIUM CHLORIDE, SODIUM LACTATE, POTASSIUM CHLORIDE, CALCIUM CHLORIDE 600; 310; 30; 20 MG/100ML; MG/100ML; MG/100ML; MG/100ML
INJECTION, SOLUTION INTRAVENOUS CONTINUOUS
Status: DISCONTINUED | OUTPATIENT
Start: 2021-10-20 | End: 2021-10-20 | Stop reason: HOSPADM

## 2021-10-19 RX ORDER — SODIUM CHLORIDE 0.9 % (FLUSH) 0.9 %
5-40 SYRINGE (ML) INJECTION EVERY 12 HOURS SCHEDULED
Status: DISCONTINUED | OUTPATIENT
Start: 2021-10-20 | End: 2021-10-20 | Stop reason: HOSPADM

## 2021-10-19 RX ORDER — ONDANSETRON 2 MG/ML
4 INJECTION INTRAMUSCULAR; INTRAVENOUS EVERY 6 HOURS PRN
Status: DISCONTINUED | OUTPATIENT
Start: 2021-10-19 | End: 2021-10-20 | Stop reason: HOSPADM

## 2021-10-19 RX ORDER — DIPHENHYDRAMINE HYDROCHLORIDE 50 MG/ML
12.5 INJECTION INTRAMUSCULAR; INTRAVENOUS ONCE
Status: COMPLETED | OUTPATIENT
Start: 2021-10-19 | End: 2021-10-19

## 2021-10-19 RX ORDER — OXYCODONE HYDROCHLORIDE 5 MG/1
5 TABLET ORAL EVERY 4 HOURS PRN
Status: DISCONTINUED | OUTPATIENT
Start: 2021-10-19 | End: 2021-10-20 | Stop reason: HOSPADM

## 2021-10-19 RX ORDER — LIDOCAINE HCL 4 %
CREAM (GRAM) TOPICAL
COMMUNITY
End: 2021-11-01 | Stop reason: ALTCHOICE

## 2021-10-19 RX ORDER — CIPROFLOXACIN 2 MG/ML
400 INJECTION, SOLUTION INTRAVENOUS EVERY 12 HOURS
Status: DISCONTINUED | OUTPATIENT
Start: 2021-10-20 | End: 2021-10-20 | Stop reason: HOSPADM

## 2021-10-19 RX ORDER — KETOROLAC TROMETHAMINE 15 MG/ML
15 INJECTION, SOLUTION INTRAMUSCULAR; INTRAVENOUS ONCE
Status: COMPLETED | OUTPATIENT
Start: 2021-10-19 | End: 2021-10-19

## 2021-10-19 RX ORDER — POTASSIUM CHLORIDE 7.45 MG/ML
10 INJECTION INTRAVENOUS PRN
Status: DISCONTINUED | OUTPATIENT
Start: 2021-10-19 | End: 2021-10-20 | Stop reason: HOSPADM

## 2021-10-19 RX ORDER — MAGNESIUM SULFATE IN WATER 40 MG/ML
2000 INJECTION, SOLUTION INTRAVENOUS PRN
Status: DISCONTINUED | OUTPATIENT
Start: 2021-10-19 | End: 2021-10-20 | Stop reason: HOSPADM

## 2021-10-19 RX ORDER — SODIUM CHLORIDE 0.9 % (FLUSH) 0.9 %
5-40 SYRINGE (ML) INJECTION PRN
Status: DISCONTINUED | OUTPATIENT
Start: 2021-10-19 | End: 2021-10-20 | Stop reason: HOSPADM

## 2021-10-19 RX ORDER — IBUPROFEN 200 MG
200 TABLET ORAL EVERY 6 HOURS PRN
Status: ON HOLD | COMMUNITY
End: 2021-10-20 | Stop reason: HOSPADM

## 2021-10-19 RX ORDER — CIPROFLOXACIN 2 MG/ML
400 INJECTION, SOLUTION INTRAVENOUS ONCE
Status: COMPLETED | OUTPATIENT
Start: 2021-10-19 | End: 2021-10-19

## 2021-10-19 RX ORDER — 0.9 % SODIUM CHLORIDE 0.9 %
1000 INTRAVENOUS SOLUTION INTRAVENOUS ONCE
Status: COMPLETED | OUTPATIENT
Start: 2021-10-19 | End: 2021-10-19

## 2021-10-19 RX ORDER — SODIUM CHLORIDE 9 MG/ML
25 INJECTION, SOLUTION INTRAVENOUS PRN
Status: DISCONTINUED | OUTPATIENT
Start: 2021-10-19 | End: 2021-10-20 | Stop reason: HOSPADM

## 2021-10-19 RX ORDER — OXYCODONE HYDROCHLORIDE 10 MG/1
10 TABLET ORAL EVERY 4 HOURS PRN
Status: DISCONTINUED | OUTPATIENT
Start: 2021-10-19 | End: 2021-10-20 | Stop reason: HOSPADM

## 2021-10-19 RX ORDER — METOCLOPRAMIDE HYDROCHLORIDE 5 MG/ML
5 INJECTION INTRAMUSCULAR; INTRAVENOUS ONCE
Status: COMPLETED | OUTPATIENT
Start: 2021-10-19 | End: 2021-10-19

## 2021-10-19 RX ADMIN — METRONIDAZOLE 500 MG: 500 INJECTION, SOLUTION INTRAVENOUS at 22:09

## 2021-10-19 RX ADMIN — CIPROFLOXACIN 400 MG: 2 INJECTION, SOLUTION INTRAVENOUS at 21:07

## 2021-10-19 RX ADMIN — METOCLOPRAMIDE 5 MG: 5 INJECTION, SOLUTION INTRAMUSCULAR; INTRAVENOUS at 18:04

## 2021-10-19 RX ADMIN — DIPHENHYDRAMINE HYDROCHLORIDE 12.5 MG: 50 INJECTION, SOLUTION INTRAMUSCULAR; INTRAVENOUS at 18:04

## 2021-10-19 RX ADMIN — SODIUM CHLORIDE 1000 ML: 9 INJECTION, SOLUTION INTRAVENOUS at 21:10

## 2021-10-19 RX ADMIN — IOPAMIDOL 75 ML: 755 INJECTION, SOLUTION INTRAVENOUS at 19:18

## 2021-10-19 RX ADMIN — KETOROLAC TROMETHAMINE 15 MG: 15 INJECTION, SOLUTION INTRAMUSCULAR; INTRAVENOUS at 18:03

## 2021-10-19 ASSESSMENT — ENCOUNTER SYMPTOMS
BACK PAIN: 0
VOMITING: 0
CONSTIPATION: 0
COLOR CHANGE: 0
ABDOMINAL PAIN: 1
NAUSEA: 1
DIARRHEA: 0

## 2021-10-19 ASSESSMENT — PAIN DESCRIPTION - ONSET: ONSET: SUDDEN

## 2021-10-19 ASSESSMENT — PAIN DESCRIPTION - PROGRESSION: CLINICAL_PROGRESSION: NOT CHANGED

## 2021-10-19 ASSESSMENT — PAIN SCALES - GENERAL
PAINLEVEL_OUTOF10: 10
PAINLEVEL_OUTOF10: 10
PAINLEVEL_OUTOF10: 0
PAINLEVEL_OUTOF10: 3

## 2021-10-19 ASSESSMENT — PAIN DESCRIPTION - PAIN TYPE: TYPE: ACUTE PAIN

## 2021-10-19 ASSESSMENT — PAIN DESCRIPTION - DESCRIPTORS: DESCRIPTORS: SHARP

## 2021-10-19 ASSESSMENT — PAIN DESCRIPTION - FREQUENCY: FREQUENCY: INTERMITTENT

## 2021-10-19 ASSESSMENT — PAIN DESCRIPTION - ORIENTATION: ORIENTATION: RIGHT;LOWER

## 2021-10-19 ASSESSMENT — PAIN DESCRIPTION - LOCATION: LOCATION: ABDOMEN

## 2021-10-19 ASSESSMENT — PAIN - FUNCTIONAL ASSESSMENT: PAIN_FUNCTIONAL_ASSESSMENT: PREVENTS OR INTERFERES SOME ACTIVE ACTIVITIES AND ADLS

## 2021-10-19 NOTE — LETTER
[address] [facility]  [address] [address]  Phone: [phone]        To whom it may concern:      October 20, 2021     Patient: Marilyn Garcia   YOB: 1995   Date of Visit: 10/19/2021       To Whom It May Concern: It is my medical opinion that Vinicius Rodriguez may return to work on 11/3/2021 with the following restrictions: lifting/carrying not to exceed 15 lbs. The lifting restriction remains in place for 4-6 weeks. If you have any questions or concerns, please don't hesitate to call.     Sincerely,            Brad Kirkpatrick RN

## 2021-10-19 NOTE — ED PROVIDER NOTES
[address] [facility]      Pt Name: Priscilla Farley  MRN: [id number]  [place] 1995  Date of evaluation: 10/19/2021  Provider: KAITLIN Song    This patient was not seen and evaluated by the attending physician No att. providers found. CHIEF COMPLAINT       Chief Complaint   Patient presents with    Abdominal Pain     RLQ, x 1 hr; +n/-v   hx ovarian cyst in the past       CRITICAL CARE TIME   I performed a total Critical Care time of 15 minutes, excluding separately reportable procedures. There was a high probability of clinically significant/life threatening deterioration in the patient's condition which required my urgent intervention. Not limited to multiple reexaminations, discussions with attending physician and consultants. HISTORY OF PRESENT ILLNESS  (Location/Symptom, Timing/Onset, Context/Setting, Quality, Duration, Modifying Factors, Severity.)   Priscilla Farley is a 27 y.o. female who presents to the emergency department with complaint of relatively sudden onset of right lower quadrant abdominal pain. She rates it 10 out of 10. Started about an hour ago. She had associated nausea no vomiting. Normal bowel movement yesterday. She reports history of ovarian cyst in the past.  She states that she has never had abdominal surgery although they \"were going into do an ovarian cyst surgery when they found out it was gone and did not have to do the procedure. \"  Did not take an thing prior for the pain. Denies chronic medical problems. Denies fevers. Denies chance of pregnancy. Denies vaginal bleeding or discharge or concern for STDs. Nursing Notes were reviewed and I agree. REVIEW OF SYSTEMS    (2-9 systems for level 4, 10 or more for level 5)     Review of Systems   Constitutional: Negative for fever. Gastrointestinal: Positive for abdominal pain and nausea. Negative for constipation, diarrhea and vomiting. Genitourinary: Negative for dysuria and vaginal discharge. Musculoskeletal: Negative for back pain. Skin: Negative for color change, rash and wound. Neurological: Negative for weakness. Psychiatric/Behavioral: Negative for agitation, behavioral problems and confusion. Except as noted above the remainder of the review of systems was reviewed and negative. PAST MEDICAL HISTORY         Diagnosis Date    Anxiety     Depression     Gallbladder polyp        SURGICAL HISTORY           Procedure Laterality Date    ESOPHAGOSCOPY         CURRENT MEDICATIONS       Previous Medications    IBUPROFEN (ADVIL;MOTRIN) 800 MG TABLET    Take 800 mg by mouth 3 times daily as needed    NAPROXEN (NAPROSYN) 500 MG TABLET    Take 1 tablet by mouth 2 times daily (with meals)       ALLERGIES     Azithromycin    FAMILY HISTORY           Problem Relation Age of Onset    Cancer Maternal Grandmother     Cancer Maternal Grandfather     Hypertension Maternal Grandfather     Arthritis Maternal Grandfather      Family Status   Relation Name Status    Mother  Alive    Father  Alive    MGM  Alive    MGF  Alive        [address] [facility]      reports that she has been smoking e-cigarettes. She has never used smokeless tobacco. She reports that she does not drink alcohol and does not use drugs. PHYSICAL EXAM    (up to 7 for level 4, 8 or more for level 5)     ED Triage Vitals [10/19/21 1705]   BP Temp Temp Source Pulse Resp SpO2 Height Weight   126/88 97.4 °F (36.3 °C) Oral 70 16 96 % 5' 5\" (1.651 m) 176 lb 9.4 oz (80.1 kg)       Physical Exam  Vitals and nursing note reviewed. Constitutional:       Appearance: She is well-developed. She is not ill-appearing. HENT:      Head: Normocephalic and atraumatic. Cardiovascular:      Rate and Rhythm: Normal rate. Pulmonary:      Effort: Pulmonary effort is normal. No respiratory distress. Abdominal:      Tenderness:  There is abdominal tenderness in the right lower quadrant. There is no guarding or rebound. Skin:     General: Skin is warm. Neurological:      General: No focal deficit present. Mental Status: She is alert and oriented to person, place, and time. Psychiatric:         Mood and Affect: Mood normal.         Behavior: Behavior normal.         DIAGNOSTIC RESULTS     RADIOLOGY:   Non-plain film images such as CT, Ultrasound and MRI are read by the radiologist. Plain radiographic images are visualized and preliminarily interpreted by KAITLIN Andujar with the below findings:    Reviewed radiologist's interpretation. Interpretation per the Radiologist below, if available at the time of this note:    CT ABDOMEN PELVIS W IV CONTRAST Additional Contrast? None   Final Result   Acute appendicitis. No free air or periappendiceal abscess. A 0.2 cm nonobstructing stone in the mid part of the left kidney. I discussed the findings by phone with the physician assistant, Jean Mcneil, at 7:53 pm on 10/19/2021. US NON OB TRANSVAGINAL   Final Result   Unremarkable pelvic ultrasound. US PELVIS COMPLETE   Final Result   Unremarkable pelvic ultrasound.                LABS:  Labs Reviewed   COMPREHENSIVE METABOLIC PANEL W/ REFLEX TO MG FOR LOW K - Abnormal; Notable for the following components:       Result Value    Glucose 132 (*)     AST 14 (*)     All other components within normal limits    Narrative:     Performed at:  [facility]  [address] [place], [place] [address]   Phone [phone]   LIPASE - Abnormal; Notable for the following components:    Lipase 65.0 (*)     All other components within normal limits    Narrative:     Performed at:  [facility]  [address] [place], [place] [address]   Phone [phone]   HCG, SERUM, QUALITATIVE    Narrative:     Performed at:  [address] [place] [address] Phone [phone]   CBC WITH AUTO DIFFERENTIAL    Narrative:     Performed at:  [facility]  [address] [facility] [place]   Phone [phone]   URINE RT REFLEX TO CULTURE       All other labs were within normal range or not returned as of this dictation. EMERGENCY DEPARTMENT COURSE and DIFFERENTIAL DIAGNOSIS/MDM:   Vitals:    Vitals:    10/19/21 1705 10/19/21 1747 10/19/21 1846   BP: 126/88 112/74 110/75   Pulse: 70     Resp: 16     Temp: 97.4 °F (36.3 °C)     TempSrc: Oral     SpO2: 96% 99% 100%   Weight: 176 lb 9.4 oz (80.1 kg)     Height: 5' 5\" (1.651 m)       Patient is afebrile not tachycardic. She has had right lower quadrant pain since today. Some nausea. No fevers. Improved pain with medications here in the ER. Initially thought it might be an ovarian cyst but ultrasound was unremarkable. CT scan does show evidence of acute appendicitis without complication. I will contact general surgery. Patient is agreeable and understands. Given antibiotics instructed to be NPO. She is vaccinated for COVID-19. CONSULTS:  IP CONSULT TO GENERAL SURGERY    PROCEDURES:  Procedures      FINAL IMPRESSION      1. Acute appendicitis, unspecified acute appendicitis type          DISPOSITION/PLAN   DISPOSITION Decision To Admit 10/19/2021 07:55:22 PM      PATIENT REFERRED TO:  No follow-up provider specified.     DISCHARGE MEDICATIONS:  New Prescriptions    No medications on file       (Please note that portions of this note were completed with a voice recognition program.  Efforts were made to edit the dictations but occasionally words are mis-transcribed.)    Gabrielle Badillo, [address], [place]  10/19/21 2027

## 2021-10-19 NOTE — ED NOTES
Pt's mother called at 5 wanting to speak to the Charge nurse, I spoke with her, she was calling regarding her daughter going to the Lobby because her daughter came in by ambulance. She stated \" why did you put my daughter in the lobby, she came in by ambulance? She walked in because the stretcher is broke that is the only reason she walked in\"  I then informed her, that her daughter did not appear to be in any immediate distress, she was not moaning, she was not clenching her stomach she was walking in a upright position she was not bending over holding her stomach and she seemed appropriate for the lobby, and she most likely would have  Gone to the lobby even if she was on a stretcher, she is not the only patient who has been sent to the lobby by ambulance, the mother then stated \" you're going to let her sit out there in F!!!!!! pain? Whats your name? i'm calling Brunilda Cole?  I gave her my first name and she then hung up the phone     This was witnessed by Macrina Ortez 5 [facility] Jenniffer Almendarez RN  10/19/21 8529

## 2021-10-20 ENCOUNTER — ANESTHESIA EVENT (OUTPATIENT)
Dept: OPERATING ROOM | Age: 26
End: 2021-10-20
Payer: MEDICAID

## 2021-10-20 ENCOUNTER — ANESTHESIA (OUTPATIENT)
Dept: OPERATING ROOM | Age: 26
End: 2021-10-20
Payer: MEDICAID

## 2021-10-20 VITALS
DIASTOLIC BLOOD PRESSURE: 73 MMHG | RESPIRATION RATE: 18 BRPM | OXYGEN SATURATION: 96 % | SYSTOLIC BLOOD PRESSURE: 126 MMHG | BODY MASS INDEX: 29.53 KG/M2 | HEIGHT: 65 IN | HEART RATE: 84 BPM | TEMPERATURE: 97.4 F | WEIGHT: 177.25 LBS

## 2021-10-20 VITALS
DIASTOLIC BLOOD PRESSURE: 54 MMHG | RESPIRATION RATE: 4 BRPM | TEMPERATURE: 98.2 F | SYSTOLIC BLOOD PRESSURE: 87 MMHG | OXYGEN SATURATION: 99 %

## 2021-10-20 PROCEDURE — 2709999900 HC NON-CHARGEABLE SUPPLY: Performed by: SURGERY

## 2021-10-20 PROCEDURE — 44970 LAPAROSCOPY APPENDECTOMY: CPT | Performed by: SURGERY

## 2021-10-20 PROCEDURE — G0378 HOSPITAL OBSERVATION PER HR: HCPCS

## 2021-10-20 PROCEDURE — 88304 TISSUE EXAM BY PATHOLOGIST: CPT

## 2021-10-20 PROCEDURE — 3600000014 HC SURGERY LEVEL 4 ADDTL 15MIN: Performed by: SURGERY

## 2021-10-20 PROCEDURE — 2500000003 HC RX 250 WO HCPCS: Performed by: SURGERY

## 2021-10-20 PROCEDURE — 6370000000 HC RX 637 (ALT 250 FOR IP): Performed by: SURGERY

## 2021-10-20 PROCEDURE — 2580000003 HC RX 258: Performed by: NURSE ANESTHETIST, CERTIFIED REGISTERED

## 2021-10-20 PROCEDURE — 94760 N-INVAS EAR/PLS OXIMETRY 1: CPT

## 2021-10-20 PROCEDURE — 6360000002 HC RX W HCPCS: Performed by: ANESTHESIOLOGY

## 2021-10-20 PROCEDURE — 7100000000 HC PACU RECOVERY - FIRST 15 MIN: Performed by: SURGERY

## 2021-10-20 PROCEDURE — 6360000002 HC RX W HCPCS: Performed by: NURSE ANESTHETIST, CERTIFIED REGISTERED

## 2021-10-20 PROCEDURE — 99219 PR INITIAL OBSERVATION CARE/DAY 50 MINUTES: CPT | Performed by: SURGERY

## 2021-10-20 PROCEDURE — 3600000004 HC SURGERY LEVEL 4 BASE: Performed by: SURGERY

## 2021-10-20 PROCEDURE — 2580000003 HC RX 258: Performed by: SURGERY

## 2021-10-20 PROCEDURE — 90686 IIV4 VACC NO PRSV 0.5 ML IM: CPT | Performed by: SURGERY

## 2021-10-20 PROCEDURE — 7100000001 HC PACU RECOVERY - ADDTL 15 MIN: Performed by: SURGERY

## 2021-10-20 PROCEDURE — 3700000000 HC ANESTHESIA ATTENDED CARE: Performed by: SURGERY

## 2021-10-20 PROCEDURE — 2500000003 HC RX 250 WO HCPCS: Performed by: NURSE ANESTHETIST, CERTIFIED REGISTERED

## 2021-10-20 PROCEDURE — 2720000010 HC SURG SUPPLY STERILE: Performed by: SURGERY

## 2021-10-20 PROCEDURE — G0008 ADMIN INFLUENZA VIRUS VAC: HCPCS | Performed by: SURGERY

## 2021-10-20 PROCEDURE — 3700000001 HC ADD 15 MINUTES (ANESTHESIA): Performed by: SURGERY

## 2021-10-20 PROCEDURE — 6360000002 HC RX W HCPCS: Performed by: SURGERY

## 2021-10-20 RX ORDER — FENTANYL CITRATE 50 UG/ML
25 INJECTION, SOLUTION INTRAMUSCULAR; INTRAVENOUS EVERY 5 MIN PRN
Status: DISCONTINUED | OUTPATIENT
Start: 2021-10-20 | End: 2021-10-20 | Stop reason: HOSPADM

## 2021-10-20 RX ORDER — BUPIVACAINE HYDROCHLORIDE 5 MG/ML
INJECTION, SOLUTION EPIDURAL; INTRACAUDAL
Status: COMPLETED | OUTPATIENT
Start: 2021-10-20 | End: 2021-10-20

## 2021-10-20 RX ORDER — ONDANSETRON 2 MG/ML
4 INJECTION INTRAMUSCULAR; INTRAVENOUS
Status: COMPLETED | OUTPATIENT
Start: 2021-10-20 | End: 2021-10-20

## 2021-10-20 RX ORDER — FENTANYL CITRATE 50 UG/ML
INJECTION, SOLUTION INTRAMUSCULAR; INTRAVENOUS PRN
Status: DISCONTINUED | OUTPATIENT
Start: 2021-10-20 | End: 2021-10-20 | Stop reason: SDUPTHER

## 2021-10-20 RX ORDER — CIPROFLOXACIN 2 MG/ML
INJECTION, SOLUTION INTRAVENOUS PRN
Status: DISCONTINUED | OUTPATIENT
Start: 2021-10-20 | End: 2021-10-20 | Stop reason: SDUPTHER

## 2021-10-20 RX ORDER — ROCURONIUM BROMIDE 10 MG/ML
INJECTION, SOLUTION INTRAVENOUS PRN
Status: DISCONTINUED | OUTPATIENT
Start: 2021-10-20 | End: 2021-10-20 | Stop reason: SDUPTHER

## 2021-10-20 RX ORDER — DOCUSATE SODIUM 100 MG/1
100 CAPSULE, LIQUID FILLED ORAL 2 TIMES DAILY PRN
Qty: 60 CAPSULE | Refills: 0 | Status: SHIPPED | OUTPATIENT
Start: 2021-10-20 | End: 2021-11-01 | Stop reason: ALTCHOICE

## 2021-10-20 RX ORDER — MAGNESIUM HYDROXIDE 1200 MG/15ML
LIQUID ORAL CONTINUOUS PRN
Status: COMPLETED | OUTPATIENT
Start: 2021-10-20 | End: 2021-10-20

## 2021-10-20 RX ORDER — DEXAMETHASONE SODIUM PHOSPHATE 4 MG/ML
INJECTION, SOLUTION INTRA-ARTICULAR; INTRALESIONAL; INTRAMUSCULAR; INTRAVENOUS; SOFT TISSUE PRN
Status: DISCONTINUED | OUTPATIENT
Start: 2021-10-20 | End: 2021-10-20 | Stop reason: SDUPTHER

## 2021-10-20 RX ORDER — MIDAZOLAM HYDROCHLORIDE 1 MG/ML
INJECTION INTRAMUSCULAR; INTRAVENOUS PRN
Status: DISCONTINUED | OUTPATIENT
Start: 2021-10-20 | End: 2021-10-20 | Stop reason: SDUPTHER

## 2021-10-20 RX ORDER — SODIUM CHLORIDE 9 MG/ML
INJECTION, SOLUTION INTRAVENOUS CONTINUOUS PRN
Status: DISCONTINUED | OUTPATIENT
Start: 2021-10-20 | End: 2021-10-20 | Stop reason: SDUPTHER

## 2021-10-20 RX ORDER — LIDOCAINE HYDROCHLORIDE 20 MG/ML
INJECTION, SOLUTION EPIDURAL; INFILTRATION; INTRACAUDAL; PERINEURAL PRN
Status: DISCONTINUED | OUTPATIENT
Start: 2021-10-20 | End: 2021-10-20 | Stop reason: SDUPTHER

## 2021-10-20 RX ORDER — OXYCODONE HYDROCHLORIDE AND ACETAMINOPHEN 5; 325 MG/1; MG/1
1 TABLET ORAL PRN
Status: DISCONTINUED | OUTPATIENT
Start: 2021-10-20 | End: 2021-10-20 | Stop reason: HOSPADM

## 2021-10-20 RX ORDER — IBUPROFEN 600 MG/1
600 TABLET ORAL 4 TIMES DAILY PRN
Qty: 120 TABLET | Refills: 1 | Status: SHIPPED | OUTPATIENT
Start: 2021-10-20 | End: 2022-11-01 | Stop reason: ALTCHOICE

## 2021-10-20 RX ORDER — ONDANSETRON 2 MG/ML
INJECTION INTRAMUSCULAR; INTRAVENOUS PRN
Status: DISCONTINUED | OUTPATIENT
Start: 2021-10-20 | End: 2021-10-20 | Stop reason: SDUPTHER

## 2021-10-20 RX ORDER — PROPOFOL 10 MG/ML
INJECTION, EMULSION INTRAVENOUS PRN
Status: DISCONTINUED | OUTPATIENT
Start: 2021-10-20 | End: 2021-10-20 | Stop reason: SDUPTHER

## 2021-10-20 RX ORDER — APREPITANT 40 MG/1
40 CAPSULE ORAL ONCE
Status: COMPLETED | OUTPATIENT
Start: 2021-10-20 | End: 2021-10-20

## 2021-10-20 RX ORDER — OXYCODONE HYDROCHLORIDE AND ACETAMINOPHEN 5; 325 MG/1; MG/1
1-2 TABLET ORAL EVERY 6 HOURS PRN
Qty: 28 TABLET | Refills: 0 | Status: SHIPPED | OUTPATIENT
Start: 2021-10-20 | End: 2021-10-27

## 2021-10-20 RX ORDER — OXYCODONE HYDROCHLORIDE AND ACETAMINOPHEN 5; 325 MG/1; MG/1
2 TABLET ORAL PRN
Status: DISCONTINUED | OUTPATIENT
Start: 2021-10-20 | End: 2021-10-20 | Stop reason: HOSPADM

## 2021-10-20 RX ADMIN — PROPOFOL 175 MG: 10 INJECTION, EMULSION INTRAVENOUS at 07:36

## 2021-10-20 RX ADMIN — METRONIDAZOLE 500 MG: 500 INJECTION, SOLUTION INTRAVENOUS at 05:56

## 2021-10-20 RX ADMIN — HYDROMORPHONE HYDROCHLORIDE 1 MG: 1 INJECTION, SOLUTION INTRAMUSCULAR; INTRAVENOUS; SUBCUTANEOUS at 08:13

## 2021-10-20 RX ADMIN — ONDANSETRON 4 MG: 2 INJECTION INTRAMUSCULAR; INTRAVENOUS at 07:59

## 2021-10-20 RX ADMIN — SODIUM CHLORIDE, POTASSIUM CHLORIDE, SODIUM LACTATE AND CALCIUM CHLORIDE: 600; 310; 30; 20 INJECTION, SOLUTION INTRAVENOUS at 00:17

## 2021-10-20 RX ADMIN — LIDOCAINE HYDROCHLORIDE 50 MG: 20 INJECTION, SOLUTION EPIDURAL; INFILTRATION; INTRACAUDAL; PERINEURAL at 07:33

## 2021-10-20 RX ADMIN — MIDAZOLAM 2 MG: 1 INJECTION INTRAMUSCULAR; INTRAVENOUS at 07:28

## 2021-10-20 RX ADMIN — SUGAMMADEX 200 MG: 100 INJECTION, SOLUTION INTRAVENOUS at 08:11

## 2021-10-20 RX ADMIN — DEXAMETHASONE SODIUM PHOSPHATE 4 MG: 4 INJECTION, SOLUTION INTRAMUSCULAR; INTRAVENOUS at 07:46

## 2021-10-20 RX ADMIN — CIPROFLOXACIN 400 MG: 2 INJECTION, SOLUTION INTRAVENOUS at 07:58

## 2021-10-20 RX ADMIN — SODIUM CHLORIDE: 9 INJECTION, SOLUTION INTRAVENOUS at 07:30

## 2021-10-20 RX ADMIN — OXYCODONE HYDROCHLORIDE 10 MG: 10 TABLET ORAL at 11:52

## 2021-10-20 RX ADMIN — INFLUENZA A VIRUS A/VICTORIA/2570/2019 IVR-215 (H1N1) ANTIGEN (PROPIOLACTONE INACTIVATED), INFLUENZA A VIRUS A/CAMBODIA/E0826360/2020 IVR-224 (H3N2) ANTIGEN (PROPIOLACTONE INACTIVATED), INFLUENZA B VIRUS B/VICTORIA/705/2018 BVR-11 ANTIGEN (PROPIOLACTONE INACTIVATED), INFLUENZA B VIRUS B/PHUKET/3073/2013 BVR-1B ANTIGEN (PROPIOLACTONE INACTIVATED) 0.5 ML: 15; 15; 15; 15 INJECTION, SUSPENSION INTRAMUSCULAR at 14:42

## 2021-10-20 RX ADMIN — HYDROMORPHONE HYDROCHLORIDE 0.5 MG: 1 INJECTION, SOLUTION INTRAMUSCULAR; INTRAVENOUS; SUBCUTANEOUS at 09:09

## 2021-10-20 RX ADMIN — APREPITANT 40 MG: 40 CAPSULE ORAL at 07:18

## 2021-10-20 RX ADMIN — ONDANSETRON 4 MG: 2 INJECTION INTRAMUSCULAR; INTRAVENOUS at 15:40

## 2021-10-20 RX ADMIN — ROCURONIUM BROMIDE 40 MG: 10 INJECTION INTRAVENOUS at 07:36

## 2021-10-20 RX ADMIN — FENTANYL CITRATE 100 MCG: 50 INJECTION INTRAMUSCULAR; INTRAVENOUS at 07:28

## 2021-10-20 RX ADMIN — METRONIDAZOLE 500 MG: 500 INJECTION, SOLUTION INTRAVENOUS at 07:32

## 2021-10-20 RX ADMIN — ONDANSETRON 4 MG: 2 INJECTION INTRAMUSCULAR; INTRAVENOUS at 09:43

## 2021-10-20 RX ADMIN — HYDROMORPHONE HYDROCHLORIDE 0.5 MG: 1 INJECTION, SOLUTION INTRAMUSCULAR; INTRAVENOUS; SUBCUTANEOUS at 09:19

## 2021-10-20 ASSESSMENT — PAIN DESCRIPTION - DESCRIPTORS
DESCRIPTORS: DULL
DESCRIPTORS: DULL;CRAMPING

## 2021-10-20 ASSESSMENT — PULMONARY FUNCTION TESTS
PIF_VALUE: 19
PIF_VALUE: 19
PIF_VALUE: 20
PIF_VALUE: 27
PIF_VALUE: 2
PIF_VALUE: 12
PIF_VALUE: 2
PIF_VALUE: 0
PIF_VALUE: 2
PIF_VALUE: 20
PIF_VALUE: 9
PIF_VALUE: 9
PIF_VALUE: 27
PIF_VALUE: 23
PIF_VALUE: 26
PIF_VALUE: 16
PIF_VALUE: 17
PIF_VALUE: 19
PIF_VALUE: 1
PIF_VALUE: 19
PIF_VALUE: 26
PIF_VALUE: 27
PIF_VALUE: 9
PIF_VALUE: 26
PIF_VALUE: 2
PIF_VALUE: 27
PIF_VALUE: 20
PIF_VALUE: 27
PIF_VALUE: 27
PIF_VALUE: 24
PIF_VALUE: 12
PIF_VALUE: 2
PIF_VALUE: 12
PIF_VALUE: 16
PIF_VALUE: 20
PIF_VALUE: 17
PIF_VALUE: 27
PIF_VALUE: 20
PIF_VALUE: 27
PIF_VALUE: 12
PIF_VALUE: 27
PIF_VALUE: 26
PIF_VALUE: 0
PIF_VALUE: 0
PIF_VALUE: 2
PIF_VALUE: 8
PIF_VALUE: 20
PIF_VALUE: 27
PIF_VALUE: 20
PIF_VALUE: 12

## 2021-10-20 ASSESSMENT — ENCOUNTER SYMPTOMS: SHORTNESS OF BREATH: 0

## 2021-10-20 ASSESSMENT — LIFESTYLE VARIABLES: SMOKING_STATUS: 0

## 2021-10-20 ASSESSMENT — PAIN SCALES - GENERAL
PAINLEVEL_OUTOF10: 7
PAINLEVEL_OUTOF10: 8
PAINLEVEL_OUTOF10: 10
PAINLEVEL_OUTOF10: 0

## 2021-10-20 ASSESSMENT — PAIN - FUNCTIONAL ASSESSMENT
PAIN_FUNCTIONAL_ASSESSMENT: PREVENTS OR INTERFERES SOME ACTIVE ACTIVITIES AND ADLS
PAIN_FUNCTIONAL_ASSESSMENT: 0-10
PAIN_FUNCTIONAL_ASSESSMENT: ACTIVITIES ARE NOT PREVENTED

## 2021-10-20 ASSESSMENT — PAIN DESCRIPTION - ORIENTATION: ORIENTATION: RIGHT;LOWER

## 2021-10-20 ASSESSMENT — PAIN DESCRIPTION - PAIN TYPE: TYPE: ACUTE PAIN

## 2021-10-20 ASSESSMENT — PAIN DESCRIPTION - LOCATION: LOCATION: ABDOMEN

## 2021-10-20 ASSESSMENT — PAIN DESCRIPTION - PROGRESSION: CLINICAL_PROGRESSION: GRADUALLY WORSENING

## 2021-10-20 ASSESSMENT — PAIN DESCRIPTION - ONSET: ONSET: ON-GOING

## 2021-10-20 ASSESSMENT — PAIN DESCRIPTION - FREQUENCY: FREQUENCY: CONTINUOUS

## 2021-10-20 NOTE — ANESTHESIA PRE PROCEDURE
Department of Anesthesiology  Preprocedure Note       Name:  Priscilla Farley   Age:  27 y.o.  :  1995                                          MRN:  [id number]         Date:  10/20/2021      Surgeon: Amber Shaffer):  Jocelyn Casillas MD    Procedure: Procedure(s):  LAPAROSCOPIC APPENDECTOMy, POSSIBLE OPEN    Medications prior to admission:   Prior to Admission medications    Medication Sig Start Date End Date Taking?  Authorizing Provider   ibuprofen (ADVIL;MOTRIN) 200 MG tablet Take 200 mg by mouth every 6 hours as needed for Pain   Yes Historical Provider, MD   Cranberry-Vitamin C (AZO CRANBERRY URINARY TRACT) 250-60 MG CAPS Take by mouth   Yes Historical Provider, MD   naproxen (NAPROSYN) 500 MG tablet Take 1 tablet by mouth 2 times daily (with meals) 21   Kathryn Shipman MD       Current medications:    Current Facility-Administered Medications   Medication Dose Route Frequency Provider Last Rate Last Admin    aprepitant (EMEND) capsule 40 mg  40 mg Oral Once Grace Pelaez MD        sodium chloride flush 0.9 % injection 5-40 mL  5-40 mL IntraVENous 2 times per day Jocelyn Casillas MD        sodium chloride flush 0.9 % injection 5-40 mL  5-40 mL IntraVENous PRN Jocelyn Casillas MD        0.9 % sodium chloride infusion  25 mL IntraVENous PRN Jocelyn Casillas MD        enoxaparin (LOVENOX) injection 40 mg  40 mg SubCUTAneous Daily Jocelyn Casillas MD        lactated ringers infusion   IntraVENous Continuous Jocelyn Casillas  mL/hr at 10/20/21 0017 New Bag at 10/20/21 0017    potassium chloride 10 mEq/100 mL IVPB (Peripheral Line)  10 mEq IntraVENous PRN Jocelyn Casillas MD        magnesium sulfate 2000 mg in 50 mL IVPB premix  2,000 mg IntraVENous PRN Jocelyn Casillas MD        ciprofloxacin (CIPRO) IVPB 400 mg  400 mg IntraVENous Q12H Jocelyn Casillas MD        metronidazole (FLAGYL) 500 mg in NaCl 100 mL IVPB premix  500 mg IntraVENous Q8H Winsome Hi  mL/hr at 10/20/21 0600 Rate Verify at 10/20/21 0600    acetaminophen (TYLENOL) tablet 650 mg  650 mg Oral Q4H PRN Winsome Hi MD        oxyCODONE (ROXICODONE) immediate release tablet 5 mg  5 mg Oral Q4H PRN Winsome Hi MD        Or   Susan Anderson oxyCODONE HCl (OXY-IR) immediate release tablet 10 mg  10 mg Oral Q4H PRN Winsome Hi MD        HYDROmorphone (DILAUDID) injection 0.5 mg  0.5 mg IntraVENous Q2H PRN Winsome Hi MD        Or    HYDROmorphone (DILAUDID) injection 1 mg  1 mg IntraVENous Q2H PRN Winsome Hi MD        ondansetron [facility]) injection 4 mg  4 mg IntraVENous Q6H PRN Winsome Hi MD        influenza quadrivalent split vaccine (FLUZONE;FLUARIX;FLULAVAL;AFLURIA) injection 0.5 mL  0.5 mL IntraMUSCular Prior to discharge Winsome Hi MD           Allergies: Allergies   Allergen Reactions    Azithromycin      Nausea         Problem List:    Patient Active Problem List   Diagnosis Code    Anxiety state F41.1    Depressive disorder, not elsewhere classified F32.9    Esophageal reflux K21.9    Anxiety F41.9    Leg mass, left R22.42    Acute appendicitis K35.80       Past Medical History:        Diagnosis Date    Anxiety     Depression     Gallbladder polyp        Past Surgical History:        Procedure Laterality Date    ESOPHAGOSCOPY         Social History:    Social History     Tobacco Use    Smoking status: Current Every Day Smoker     Types: E-Cigarettes    Smokeless tobacco: Never Used   Substance Use Topics    Alcohol use:  No                                Ready to quit: Not Answered  Counseling given: Not Answered      Vital Signs (Current):   Vitals:    10/19/21 2315 10/19/21 2333 10/20/21 0449 10/20/21 0658   BP: 134/75 118/84 102/69 121/89   Pulse: 65 69 75 87   Resp: 16 16 16 16   Temp: 98.7 °F (37.1 °C) 98.7 °F (37.1 °C) 97.4 °F (36.3 °C) 97.9 °F (36.6 °C)   TempSrc: Oral Oral Oral Temporal   SpO2: 99% 100% 97% 99%   Weight:   177 lb 4 oz (80.4 kg)    Height:                                                  BP Readings from Last 3 Encounters:   10/20/21 121/89   07/30/21 118/78   02/19/21 121/65       NPO Status: Time of last liquid consumption: 1800                        Time of last solid consumption: 1800                        Date of last liquid consumption: 10/18/21                        Date of last solid food consumption: 10/18/21    BMI:   Wt Readings from Last 3 Encounters:   10/20/21 177 lb 4 oz (80.4 kg)   07/30/21 179 lb 9.6 oz (81.5 kg)   02/19/21 168 lb (76.2 kg)     Body mass index is 29.5 kg/m². CBC:   Lab Results   Component Value Date    WBC 7.0 10/19/2021    RBC 4.55 10/19/2021    HGB 12.3 10/19/2021    HCT 38.0 10/19/2021    MCV 83.4 10/19/2021    RDW 14.1 10/19/2021     10/19/2021       CMP:   Lab Results   Component Value Date     10/19/2021    K 4.2 10/19/2021     10/19/2021    CO2 23 10/19/2021    BUN 12 10/19/2021    CREATININE 0.8 10/19/2021    GFRAA >60 10/19/2021    AGRATIO 1.6 10/19/2021    LABGLOM >60 10/19/2021    GLUCOSE 132 10/19/2021    PROT 7.1 10/19/2021    CALCIUM 9.0 10/19/2021    BILITOT 0.5 10/19/2021    ALKPHOS 55 10/19/2021    AST 14 10/19/2021    ALT 12 10/19/2021       POC Tests: No results for input(s): POCGLU, POCNA, POCK, POCCL, POCBUN, POCHEMO, POCHCT in the last 72 hours.     Coags: No results found for: PROTIME, INR, APTT    HCG (If Applicable):   Lab Results   Component Value Date    PREGTESTUR Negative 02/04/2021        ABGs: No results found for: PHART, PO2ART, MIB8VIF, YSP5ZHU, BEART, S0FDMCYJ     Type & Screen (If Applicable):  No results found for: LABABO, LABRH    Drug/Infectious Status (If Applicable):  No results found for: HIV, HEPCAB    COVID-19 Screening (If Applicable):   Lab Results   Component Value Date    COVID19 Not Detected 10/19/2021           Anesthesia Evaluation  Patient summary reviewed no history of anesthetic complications:   Airway: Mallampati: II  TM distance: >3 FB   Neck ROM: full  Mouth opening: > = 3 FB Dental:      Comment: No loose teeth    Pulmonary: breath sounds clear to auscultation      (-) COPD, asthma, shortness of breath, recent URI, sleep apnea and not a current smoker                           Cardiovascular:        (-) hypertension, valvular problems/murmurs, past MI, CAD, CABG/stent, dysrhythmias,  angina,  CHF, orthopnea and no pulmonary hypertension      Rhythm: regular  Rate: normal                    Neuro/Psych:   (+) psychiatric history:   (-) seizures, neuromuscular disease, TIA, CVA and headaches           GI/Hepatic/Renal:   (+) GERD:,      (-) PUD, hepatitis, liver disease, no renal disease and bowel prep      ROS comment: Acute appy . Endo/Other:        (-) diabetes mellitus, hypothyroidism, hyperthyroidism, blood dyscrasia, arthritis               Abdominal:             Vascular: Other Findings:           Anesthesia Plan      general     ASA 2       Induction: intravenous. MIPS: Postoperative opioids intended and Prophylactic antiemetics administered. Anesthetic plan and risks discussed with patient. Plan discussed with CRNA. This pre-anesthesia assessment may be used as a history and physical.    DOS STAFF ADDENDUM:    Pt seen and examined, chart reviewed (including anesthesia, drug and allergy history). No interval changes to history and physical examination. Anesthetic plan, risks, benefits, alternatives, and personnel involved discussed with patient. Patient verbalized an understanding and agrees to proceed.       Chris Leonard MD  October 20, 2021  7:15 AM    Chris Leonard MD   10/20/2021

## 2021-10-20 NOTE — PROGRESS NOTES
Medication Reconciliation     List of medications patient is currently taking is complete. Source of information:   1. Conversation with patient at bedside  2. EPIC records        Notes regarding home medications:  1. Patient takes no prescription medications. 2. Added AZO and Ibuprofen OTC/Supplement because the patient has taken them in the past month.     Kvng Rosado, Pharmacy Intern  10/19/2021 9:06 PM

## 2021-10-20 NOTE — PROGRESS NOTES
IV and telemetry monitor removed. Discharge paperwork completed and discussed with the patient and patient's mother. All questions answered. Patient has been made aware of follow up appointment needing to be made in two weeks with Dr Kwadwo Peterson. Patient has been given all paper prescriptions that need to be filled and instructed to take to the pharmacy of her choice. Post surgery after care reviewed with patient, patient denies questions. All belongings have been packed up and sent with the patient. Patient will be driven home by her mother.     Electronically signed by Monica Chavarria RN on 10/20/2021 at 4:18 PM

## 2021-10-20 NOTE — PROGRESS NOTES
Pt return to room 5123 from PACU. Vital signs taken, view flowsheets. No complaints of pain at this time. Patient feeling sleepy and not up to eating anything at this time. Will continue to monitor.     Electronically signed by Lina Haines RN on 10/20/2021 at 10:04 AM

## 2021-10-20 NOTE — ED NOTES
ED SBAR report provider to Alessandra Lamas, [facility]. Patient to be transported to Room 5123 via stretcher by ED tech. . IV site clean, dry, and intact. MEWS score and pain assessed and documented. Updated patient and family on plan of care.        Georgi Jj RN  10/19/21 6039

## 2021-10-20 NOTE — PROGRESS NOTES
Admitted to PACU 11 from OR, pt sedate with oral airway intact, VSS on 2L O2 NC. Report recd from anesthesia.

## 2021-10-20 NOTE — PROGRESS NOTES
Flu shot given per patient request. Pt tolerated well.     Electronically signed by Ji Salazar RN on 10/20/2021 at 2:51 PM

## 2021-10-20 NOTE — OP NOTE
Laparoscopic Appendectomy Operative Report      Patient: Johnny Murcia MRN: [id number]     YOB: 1995  Age: 27 y.o. Sex: female        Primary Care Physician: Taya Mcadams DO         DATE OF OPERATION: 10/20/21    PREOPERATIVE DIAGNOSIS: acute appendicitis    POSTOPERATIVE DIAGNOSIS: acute appendicitis - non-perforated    PROCEDURE PERFORMED: Laparoscopic appendectomy     SURGEON: Ashley Samuel MD, MD    ANESTHESIA: GETA with local anesthetic. FINDINGS: dilated and inflamed appendix consistent with acute appendicitis. ASA CLASS: 2    ANTIBIOTICS: therapeutic cipro/flagyl IV. DVT PROPHYLAXIS: Bilateral pneumatic compression boots. INDICATIONS:  The patient came to the emergency department with acute onset of generalized   abdominal pain that localized to the right lower quadrant. History, physical exam and CAT scan confirmed acute appendicitis. As a result, the risks, benefits, and alternatives of appendectomy were discussed at length including bleeding, infection, injury to other organs and conversion to open. All questions were answered and the patient was agreeable to proceed. PROCEDURE:   The patient was brought to the operating suite, placed in a   supine position on the operating room table. General anesthesia was induced   which she tolerated well. The abdomen then prepped and draped in the usual sterile fashion and a timeout was performed. Pneumoperitoneum was established via Veress needle through an supraumbilical incision. A 5 mm trocar was then inserted and the laparoscope followed. No injury from Veress needle placement was encountered. As a result, additional 5 mm port was placed in the suprapubic area and another 10 mm port in the left lower quadrant. We turned our attention then back to the right lower   quadrant and there was moderate inflammation noted. There was some thin bloody appearing fluid in the pelvis.   Both ovaries were visualized without obvious ruptured cyst.  The appendix itself was identified and noted to be enlarged/inflamed. The appendix was retrocecal.  We first created a window at the appendiceal cecal junction, and then took down the mesoappendix using a Ligasure. There was no evidence of perforation. The appendix was then transected with a FILIPE stapler with a blue load. The appendix was then placed in a laparoscopic retrieval bag and removed from the abdomen. We returned our attention back to the surgical site and staple line was intact. There was minimal oozing that was controlled with electrocatuery. The mesoappendix itself was hemostatic. All prior residual blood was suctioned out. At that point, no additional pathology was encountered throughout the abdomen. The 10 mm port was then   removed and the fascia closed with an 0 Vicryl using a laparoscopic suture   passer. The abdomen was then desufflated. The remaining trocars were removed. The skin was closed with 4-0 Vicryl. Long-acting anesthetic was injected at   the end. Dermabond was applied to the   wounds. The patient tolerated the procedure well. She was extubated in the   operating room and taken to PACU in stable condition. All counts were   correct at the end of the case.     Complications: none    EBL: less than 50 ml    Specimen: appendix    Electronically signed by Darien Yepez MD on 10/20/2021 at 8:24 AM

## 2021-10-20 NOTE — PROGRESS NOTES
Patient able to eat ~50% of lunch, has been able to get up and walk around room and go to the bathroom. Patient will be driven home by her mother who is at the bedside.     Electronically signed by Elfego Sultana RN on 10/20/2021 at 3:24 PM

## 2021-10-20 NOTE — ANESTHESIA POSTPROCEDURE EVALUATION
Department of Anesthesiology  Postprocedure Note    Patient: Ric Alfaro  MRN: [id number]  YOB: 1995  Date of evaluation: 10/20/2021  Time:  10:02 AM     Procedure Summary     Date: 10/20/21 Room / Location: [facility] [address] [place] [address]    Anesthesia Start: 0730 Anesthesia Stop: 5596    Procedure: LAPAROSCOPIC APPENDECTOMY (N/A Abdomen) Diagnosis: (unknown)    Surgeons: Stephan Shaikh MD Responsible Provider: Yodit Ruiz MD    Anesthesia Type: general ASA Status: 2          Anesthesia Type: general    Pamela Phase I: Pamela Score: 10    Pamela Phase II:      Last vitals: Reviewed and per EMR flowsheets.        Anesthesia Post Evaluation    Patient location during evaluation: PACU  Patient participation: complete - patient participated  Level of consciousness: awake and alert  Airway patency: patent  Nausea & Vomiting: no nausea and no vomiting  Complications: no  Cardiovascular status: hemodynamically stable  Respiratory status: acceptable  Hydration status: stable

## 2021-10-20 NOTE — PROGRESS NOTES
Pt awake & conversing, VSS on RA, pain is 4/10 now and tolerable, tolerating ice chips. Report called to [facility] on 5W. Transport via bed back to rm 5123.

## 2021-10-20 NOTE — H&P
General Surgery History & Physical      Oscar Kirk   : 1995 MRN: [id number]  Date of Admission: 10/19/2021  Admitting Carmella Duke MD  Primary Care Physician: Jessica Ortiz DO    Chief Complaint   Patient presents with    Abdominal Pain     RLQ, x 1 hr; +n/-v   hx ovarian cyst in the past       Chief Complaint/Reason for consultation: \"right lower quadrant abdominal pain\"    Diagnosis Present on Admission:   Acute appendicitis      Secondary Diagnosis  Patient Active Problem List   Diagnosis    Anxiety state    Depressive disorder, not elsewhere classified    Esophageal reflux    Anxiety    Leg mass, left    Acute appendicitis       History of Present Illness  Oscar Kirk is a 27 y.o. female who presented last evening after acute onset of RLQ pain. Pain started at 4 pm and then progressively worsened, leading her to come in to be evaluated. + nausea, no emesis. No fevers or chills. No change in bowel habits. Hx of laparoscopic removal of ovarian cyst.      Past Medical History:   Diagnosis Date    Anxiety     Depression     Gallbladder polyp      Past Surgical History:   Procedure Laterality Date    ESOPHAGOSCOPY       Family history reviewed and otherwise negative.   Family History   Problem Relation Age of Onset    Cancer Maternal Grandmother     Cancer Maternal Grandfather     Hypertension Maternal Grandfather     Arthritis Maternal Grandfather      Social History     Socioeconomic History    Marital status: Single     Spouse name: Not on file    Number of children: Not on file    Years of education: Not on file    Highest education level: Not on file   Occupational History    Not on file   Tobacco Use    Smoking status: Current Every Day Smoker     Types: E-Cigarettes    Smokeless tobacco: Never Used   Vaping Use    Vaping Use: Every day   Substance and Sexual Activity    Alcohol use: No    Drug use: No    Sexual activity: Never Other Topics Concern    Not on file   Social History Narrative    Not on file     Social Determinants of Health     Financial Resource Strain: Low Risk     Difficulty of Paying Living Expenses: Not hard at all   Food Insecurity: No Food Insecurity    Worried About Running Out of Food in the Last Year: Never true    [address] in the Last Year: Never true   Transportation Needs:     Lack of Transportation (Medical):  Lack of Transportation (Non-Medical):    Physical Activity:     Days of Exercise per Week:     Minutes of Exercise per Session:    Stress:     Feeling of Stress :    Social Connections:     Frequency of Communication with Friends and Family:     Frequency of Social Gatherings with Friends and Family:     Attends Sikhism Services:     Active Member of Clubs or Organizations:     Attends Club or Organization Meetings:     Marital Status:    Intimate Partner Violence:     Fear of Current or Ex-Partner:     Emotionally Abused:     Physically Abused:     Sexually Abused: Allergies   Allergen Reactions    Azithromycin      Nausea       Prior to Admission medications    Medication Sig Start Date End Date Taking? Authorizing Provider   ibuprofen (ADVIL;MOTRIN) 200 MG tablet Take 200 mg by mouth every 6 hours as needed for Pain   Yes Historical Provider, MD   Cranberry-Vitamin C (AZO CRANBERRY URINARY TRACT) 250-60 MG CAPS Take by mouth   Yes Historical Provider, MD   naproxen (NAPROSYN) 500 MG tablet Take 1 tablet by mouth 2 times daily (with meals) 2/4/21   Evaristo Thakkar MD       Review of Systems  Pertinent positives and negatives are in HPI, otherwise all systems reviewed and negative    Physical Exam  Vitals:    10/20/21 0658   BP: 121/89   Pulse: 87   Resp: 16   Temp: 97.9 °F (36.6 °C)   SpO2: 99%       General/Appearance: NAD, appears stated age  HEENT: PERRLA, Conjunctiva non injected, no scleral icterus.   Mucous membranes pink and moist.  Neck is symmetrical. Trachea appears midline. Lung: normal respiratory effort, no accessory muscle use  Cardiac: Regular rate and rhythm  Abdomen: soft, ND, moderately tender in RLQ with focal peritonitis  Neuro: No gross motor or sensory deficits. Skin: No open wounds or rashes. Labs  Recent Labs     10/19/21  1711   WBC 7.0   HGB 12.3   HCT 38.0        Recent Labs     10/19/21  1711      K 4.2      CO2 23   BUN 12   GFRAA >60     Recent Labs     10/19/21  1711   GLOB 2.7   AST 14*   ALT 12     Invalid input(s): UAPR, UCOL, [facility], [place], [facility], [place], [place], [place], [place], EOS    Imaging  CT ABDOMEN PELVIS W IV CONTRAST Additional Contrast? None   Final Result   Acute appendicitis. No free air or periappendiceal abscess. A 0.2 cm nonobstructing stone in the mid part of the left kidney. I discussed the findings by phone with the physician assistant, Thomasena Duverney, at 7:53 pm on 10/19/2021. US NON OB TRANSVAGINAL   Final Result   Unremarkable pelvic ultrasound. US PELVIS COMPLETE   Final Result   Unremarkable pelvic ultrasound. CT abd/pelvis personally reviewed, showing dilated and inflamed appendix consistent with appendicitis    Assessment  Regan Silver is a 27 y.o. female admitted for acute appendicitis    Plan    1. Acute appendicitis  · NPO  · IV cipro/flagyl  · Will proceed to the OR for laparoscopic appendectomy, possible open. The risks, benefits, and alternatives were discussed with the patient and she is willing to consent for the operation. · Hopeful discharge home later today if surgery uneventful.       Les Lamas MD

## 2021-10-20 NOTE — DISCHARGE INSTR - COC
Continuity of Care Form    Patient Name: Jose Johns   :  1995  MRN:  [id number]    Admit date:  10/19/2021  Discharge date:  ***    Code Status Order: Full Code   Advance Directives:   Advance Care Flowsheet Documentation     Date/Time Healthcare Directive Type of Healthcare Directive Copy in [address] Agent's Name Healthcare Agent's Phone Number    10/20/21 0701  No, patient does not have an advance directive for healthcare treatment  --  --  --  --  --          Admitting Physician:  Craig Hill MD  PCP: Myah Martins DO    Discharging Nurse: [facility] Unit/Room#: N7S-3009/5123-01  Discharging Unit Phone Number: ***    Emergency Contact:   Extended Emergency Contact Information  Primary Emergency Contact: [facility]  Address: [address], [address] [place] [address] Phone: [phone]  Relation: Parent  Secondary Emergency Contact: [place] [address] Phone: [phone]  Relation: Parent    Past Surgical History:  Past Surgical History:   Procedure Laterality Date    ESOPHAGOSCOPY      LAPAROSCOPIC APPENDECTOMY N/A 10/20/2021    LAPAROSCOPIC APPENDECTOMY performed by Craig Hill MD at [place] [address]       Immunization History:   Immunization History   Administered Date(s) Administered    COVID-19, Moderna, PF, 100mcg/0.5mL 2021, 2021    DTaP 1993, 1993, 1993, 1994, 1998    Hepatitis B 1995, 1993, 1994    Hib, unspecified 1993, 1993, 1993, 1994    Influenza Vaccine, unspecified formulation 2003, 2008, 2010, 2011, 11/15/2012, 2013, 2014, 10/21/2015, 2016    Influenza Virus Vaccine 2008, 2010, 10/21/2015, 2016, 2017, 2018    Influenza, Quadv, IM, PF (6 mo and older Fluzone, Flulaval, Fluarix, and 3 yrs and older Afluria) 2017, BM: ***    Intake/Output Summary (Last 24 hours) at 10/20/2021 1030  Last data filed at 10/20/2021 0815  Gross per 24 hour   Intake 849.64 ml   Output --   Net 849.64 ml     I/O last 3 completed shifts:   In: 99.6 [IV Piggyback:99.6]  Out: -     Safety Concerns:     [address] LEEROY Safety Concerns:387315644}    Impairments/Disabilities:      [address] LEEROY Impairments/Disabilities:174638432}    Nutrition Therapy:  Current Nutrition Therapy:   { LEEROY Diet List:807470161}    Routes of Feeding: {[facility] Other Feedings:604154876}  Liquids: {Slp liquid thickness:53304}  Daily Fluid Restriction: {[facility] Yes amt example:380188602}  Last Modified Barium Swallow with Video (Video Swallowing Test): {Done Not Done DAPL:739389286}    Treatments at the Time of Hospital Discharge:   Respiratory Treatments: ***  Oxygen Therapy:  {Therapy; copd oxygen:67672}  Ventilator:    { CC Vent AHGN:131682863}    Rehab Therapies: {THERAPEUTIC INTERVENTION:3717607590}  Weight Bearing Status/Restrictions: [address]  Weight Bearin}  Other Medical Equipment (for information only, NOT a DME order):  {EQUIPMENT:819507486}  Other Treatments: ***    Patient's personal belongings (please select all that are sent with patient):  {[facility] D[facility] Belongings:103437714}    RN SIGNATURE:  {Esignature:360184259}    CASE MANAGEMENT/SOCIAL WORK SECTION    Inpatient Status Date: ***    Readmission Risk Assessment Score:  Readmission Risk              Risk of Unplanned Readmission:  0           Discharging to Facility/ Agency   · Name:   · Address:  · Phone:  · Fax:    Dialysis Facility (if applicable)   · Name:  · Address:  · Dialysis Schedule:  · Phone:  · Fax:    / signature: {Esignature:562738491}    PHYSICIAN SECTION    Prognosis: {Prognosis:5546781034}    Condition at Discharge: [address] Patient Condition:102793833}    Rehab Potential (if transferring to Rehab): {Prognosis:1160701013}    Recommended Labs or Other Treatments After Discharge: ***    Physician Certification: I certify the above information and transfer of Shweta Shea  is necessary for the continuing treatment of the diagnosis listed and that she requires {Admit to Appropriate Level of Care:75120} for {GREATER/LESS:387551722} 30 days.      Update Admission H&P: {CHP DME Changes in VNBIO:315429316}    PHYSICIAN SIGNATURE:  {Esignature:739638387}

## 2021-10-25 NOTE — DISCHARGE SUMMARY
Physician Discharge Summary     Patient ID:  Doug Randall  [id number]  89 y.o.  1995    Admit date: 10/19/2021    Discharge date and time: 10/20/2021  4:21 PM     Admitting Physician: Grace Noyola MD     Discharge Physician: Grace Noyola MD    Admission Diagnoses: Acute appendicitis [K35.80]  Acute appendicitis, unspecified acute appendicitis type [K35.80]    Discharge Diagnoses: same    Admission Condition: fair    Discharged Condition: good    Indication for Admission: 26 yo female presented with acute onset of RLQ pain. CT imaging showed acute appendicitis. Hospital Course: The patient was admitted overnight and taken to surgery the following morning. Her laparoscopic appendectomy was uneventful, and she was discharged home on the day of surgery in stable condition. Consults: none    Significant Diagnostic Studies: CT abd/pelvis    Treatments: IV ciprofloxacin, flagyl. Laparoscopic appendectomy    Discharge Exam:  Abd soft, ND, appropriately tender, incisions c/d/i, no erythema or induration    Disposition: home    In process/preliminary results:  Outstanding Order Results     No orders found from 9/20/2021 to 10/20/2021. Patient Instructions:   Discharge Medication List as of 10/20/2021  2:56 PM      START taking these medications    Details   oxyCODONE-acetaminophen (PERCOCET) 5-325 MG per tablet Take 1-2 tablets by mouth every 6 hours as needed for Pain for up to 7 days. Intended supply: 7 days.  Take lowest dose possible to manage pain, Disp-28 tablet, R-0Print      docusate sodium (COLACE) 100 MG capsule Take 1 capsule by mouth 2 times daily as needed for Constipation (take while on narcotic pain medication), Disp-60 capsule, R-0Print         CONTINUE these medications which have CHANGED    Details   ibuprofen (ADVIL;MOTRIN) 600 MG tablet Take 1 tablet by mouth 4 times daily as needed for Pain, Disp-120 tablet, R-1Print         CONTINUE these medications which have NOT CHANGED    Details   Cranberry-Vitamin C (AZO CRANBERRY URINARY TRACT) 250-60 MG CAPS Take by mouthHistorical Med         STOP taking these medications       naproxen (NAPROSYN) 500 MG tablet Comments:   Reason for Stopping:             Activity: no lifting greater than 15 lbs x 6 weeks, no driving x 1 week  Diet: regular diet  Wound Care: as directed    Follow-up with Dr. Donna Hanna in 2 weeks.     Signed:  Christina García MD  10/25/2021  11:37 AM

## 2021-11-01 ENCOUNTER — OFFICE VISIT (OUTPATIENT)
Dept: SURGERY | Age: 26
End: 2021-11-01

## 2021-11-01 VITALS — BODY MASS INDEX: 29.5 KG/M2 | HEIGHT: 65 IN

## 2021-11-01 DIAGNOSIS — Z90.49 S/P LAPAROSCOPIC APPENDECTOMY: ICD-10-CM

## 2021-11-01 DIAGNOSIS — K35.30 ACUTE APPENDICITIS WITH LOCALIZED PERITONITIS, WITHOUT PERFORATION, ABSCESS, OR GANGRENE: Primary | ICD-10-CM

## 2021-11-01 PROCEDURE — 99024 POSTOP FOLLOW-UP VISIT: CPT | Performed by: SURGERY

## 2021-11-01 NOTE — LETTER
[facility] Vascular Surgery  [address]  [facility] [address]  Phone: [phone]  Fax: [phone]    Ferdie Gilford, MD        November 1, 2021     Patient: Juan Abel   YOB: 1995   Date of Visit: 11/1/2021       To Whom It May Concern: It is my medical opinion that Stephen Nunes may return to light duty on 11/3/21 with the following restrictions: lifting/carrying not to exceed 15 lbs. She may return to regular duty without restrictions on 12/1/21. If she is unable to go back to light duty, please excuse her from work until she is able to return to regular duty. If you have any questions or concerns, please don't hesitate to call.     Sincerely,      Ferdie Gilford, MD

## 2021-11-01 NOTE — PROGRESS NOTES
Post Operative Visit    [address] [facility]  [place] and Vascular Surgery   Jose R Wiley MD    [address]  [place]  Phone: [phone]  Fax: [phone]    Hernesto Ruiz   YOB: 1995    Date of Visit:  11/1/2021    No ref. provider found  Cierra Hines DO    Subjective:     Hernesto Ruiz presents for a two week follow-up s/p laparoscopic appendectomy. Overall she has been doing well since her operation. She has been eating/drinking without difficulty. She initially had some constipation, but since has had bowel movements have returned to normal. There has been some relief of her pain. It is  controlled with her current pain regimen. She denies any fevers or chills. Pain Score:   4    Allergies   Allergen Reactions    Azithromycin      Nausea       Outpatient Medications Marked as Taking for the 11/1/21 encounter (Office Visit) with Yadi Jackson MD   Medication Sig Dispense Refill    ibuprofen (ADVIL;MOTRIN) 600 MG tablet Take 1 tablet by mouth 4 times daily as needed for Pain 120 tablet 1       Vitals:    11/01/21 1256   Height: 5' 5\" (1.651 m)     Body mass index is 29.5 kg/m².      Wt Readings from Last 3 Encounters:   10/20/21 177 lb 4 oz (80.4 kg)   07/30/21 179 lb 9.6 oz (81.5 kg)   02/19/21 168 lb (76.2 kg)     BP Readings from Last 3 Encounters:   10/20/21 126/73   10/20/21 (!) 87/54   07/30/21 118/78          Objective:    CONSTITUTIONAL:  awake, alert, no apparent distress    LUNGS:  Resp easy and unlabored  ABDOMEN:  Incisions c/d/i, no erythema or induration, soft, non-distended, non-tender, voluntary guarding absent,  and hernia absent  MUSCULOSKELETAL: No edema  NEUROLOGIC:  Mental Status Exam:  Level of Alertness:   awake  Orientation:   person, place, time  SKIN: as above      Pathology:  FINAL DIAGNOSIS:     Appendix, appendectomy      - Transmural acute appendicitis and periappendicitis      KIRSE/KIRSE ASSESSMENT:     Diagnosis Orders   1. Acute appendicitis with localized peritonitis, without perforation, abscess, or gangrene     2. S/P laparoscopic appendectomy         PLAN:    Dalila Mak is doing well s/p laparoscopic appendectomy. Incisions are healing appropriately. Will plan on having her follow up prn. She is to continue with lifting restrictions for the next 4 weeks to reduce the chance of hernia. Work excuse given.     Electronically signed by Merissa Lora MD on 11/1/2021 at 1:13 PM

## 2022-10-27 ENCOUNTER — INITIAL CONSULT (OUTPATIENT)
Dept: SURGERY | Age: 27
End: 2022-10-27
Payer: MEDICAID

## 2022-10-27 VITALS
BODY MASS INDEX: 27.62 KG/M2 | WEIGHT: 166 LBS | SYSTOLIC BLOOD PRESSURE: 119 MMHG | HEART RATE: 60 BPM | DIASTOLIC BLOOD PRESSURE: 87 MMHG

## 2022-10-27 DIAGNOSIS — R22.42 LEG MASS, LEFT: Primary | ICD-10-CM

## 2022-10-27 PROCEDURE — 1036F TOBACCO NON-USER: CPT | Performed by: SURGERY

## 2022-10-27 PROCEDURE — G8419 CALC BMI OUT NRM PARAM NOF/U: HCPCS | Performed by: SURGERY

## 2022-10-27 PROCEDURE — G8427 DOCREV CUR MEDS BY ELIG CLIN: HCPCS | Performed by: SURGERY

## 2022-10-27 PROCEDURE — 99213 OFFICE O/P EST LOW 20 MIN: CPT | Performed by: SURGERY

## 2022-10-27 PROCEDURE — G8482 FLU IMMUNIZE ORDER/ADMIN: HCPCS | Performed by: SURGERY

## 2022-10-27 NOTE — Clinical Note
Chris Perez,  I just saw Ms. Aguilar Peers in the office for her left anterior thigh mass, which is consistent with a lipoma. I am going to excise it in the OR in the near future. Thank you for allowing me to take care of Ms. [address] with you.   Jeannie Mccullough

## 2022-10-27 NOTE — PATIENT INSTRUCTIONS
Surgeon: Ronen Pham MD  Your surgery will be at [facility] ORTHOPEDIC AND SPINE [facility] AT [place]  First floor of the [facility].. [address]    Date:   11-9-22    Arrival time: 9am    Surgery time: 10:30am    (  X  ) Outpatient with IV sedation     Nothing to eat or drink after midnight the night before. FASTING SURGERY     You will need to have a  drive you home from the hospital.       Please contact [facility] if you need to reschedule your surgery.    Office phone number:     [phone]  Fax number for [facility]:    [phone]

## 2022-10-27 NOTE — LETTER
CONSENT TO OPERATION                 Excision of left thigh lipoma     PATIENT : Javier Bustamante  YOB: 1995             DATE : 10/27/22    1. I request and consent that Dr. Landon Jang and/or his associates or assistants perform an operation and/or procedures on the above patient at [facility], to treat the condition(s) which appear indicated by the diagnostic studies already performed. 2. It has been explained to me by the informing physician that during the course of the operation and/or procedure(s) unforeseen conditions may be revealed that necessitate an extension of the original operation and/or procedure(s) or different operation and/or procedures than those set forth in Paragraph 1. I therefore authorize and request that my physician and/or his associates or assistants perform such operations and/or procedures as are necessary and desirable in the exercise of professional judgment. The authority granted under this Paragraph 2 shall extend to all conditions that require treatment and are known to my physician at the time the operation is commenced. 3. I have been made aware by the informing physician of certain risks and consequences that are associated with the operation and/or procedure(s) described in Paragraph 1, the reasonable alternative methods or treatment, the possible consequences, the possibility that the operation and/or procedure(s) may be unsuccessful and the possibility of complications. I understand the reasonably known risks to be:  - Bleeding  - Infection  - Poor Healing  - Possible Damage to Nerve, Vessel, Tendon/Muscle or Bone  - Need for further Treatment/Surgery  - Stiffness  - Pain  - Residual or Recurrent Symptoms  - Anesthetic and/or Medical Risks     4. I have also been informed by the informing physician that there are other risks from both known and unknown causes that are attendant to the performance of any surgical procedure.   I am aware that the practice of medicine and surgery is not an exact science, and that no guarantees have been made to me concerning the results of the operation and/or procedure(s). 5. I consent to the administration of anesthesia and to the use of such anesthetics as may be deemed advisable by the anesthesiologist who has been engaged by me or my physician. 6. I certify that I have read and understand the above consent to operation and/or other procedure(s); that the explanations therein referred to were made to me by the informing physician in advance of my signing this consent; that all blanks or statements requiring insertion or completion were filled in and inapplicable paragraphs, if any, were stricken before I signed; and that all questions asked by me about the operation and/or procedure(s) which I have consented to have been fully answered in a satisfactory manner.            10/27/22                    _______________________  Signature Of Patient   Date              Witness                     OR Date:  ___________

## 2022-10-27 NOTE — PROGRESS NOTES
Established Patient Visit    [address] [facility]  [place] and Vascular Surgery   Kia Lozada MD    [address], [address]  [facility]  Phone: [phone]  Fax: [phone]    Donna Virgen   YOB: 1995    Date of Visit:  10/27/2022    [place], [place]    Subjective:     Donna Virgen presents for evaluation of a left thigh mass. She states that it has been present for over a year, but is enlarging and causing pain with friction of clothing. Due to protruding from her leg, she covers the mass with clothing each day. No overlying skin changes. Never had anything like this before. Allergies   Allergen Reactions    Azithromycin      Nausea       Outpatient Medications Marked as Taking for the 10/27/22 encounter (Initial consult) with Monique Gonzalez MD   Medication Sig Dispense Refill    ibuprofen (ADVIL;MOTRIN) 600 MG tablet Take 1 tablet by mouth 4 times daily as needed for Pain 120 tablet 1       Vitals:    10/27/22 1405   BP: 119/87   Pulse: 60   Weight: 166 lb (75.3 kg)     Body mass index is 27.62 kg/m². Wt Readings from Last 3 Encounters:   10/27/22 166 lb (75.3 kg)   10/13/22 166 lb 8 oz (75.5 kg)   10/20/21 177 lb 4 oz (80.4 kg)     BP Readings from Last 3 Encounters:   10/27/22 119/87   10/13/22 122/72   10/20/21 126/73          Objective:    CONSTITUTIONAL:  awake, alert, no apparent distress    LUNGS:  Resp easy and unlabored  MUSCULOSKELETAL: No edema  NEUROLOGIC:  Mental Status Exam:  Level of Alertness:   awake  Orientation:   person, place, time  SKIN: left anterior thigh with protruding mass approximately 2-3 cm, no overlying skin changes        ASSESSMENT:     Diagnosis Orders   1. Leg mass, left              PLAN:    Donna Virgen has an enlarging left leg mass consistent with a lipoma. Will plan for excision of left thigh lipoma under MAC anesthesia.   The risks, benefits, and alternatives were discussed with the patient and she is willing to consent for the operation. Will tentatively plan for 11/9.     Electronically signed by Piyush Soto MD on 10/27/2022

## 2022-10-27 NOTE — LETTER
Surgery Scheduling Form:      DEMOGRAPHICS:                                                                                                         .    Patient Name:  Travis Nicholas  Patient :  1995   Patient SS#:      Patient Phone:  [phone] (home)  Alt. Patient Phone:                     Patient Address:  [address]    PCP:  KAITLIN Suazo  Insurance:  Payor: [employer] / Plan: [employer]  / Product Type: *No Product type* /   Insurance ID Number:    Payer/Plan Subscr  Sex Relation Sub. Ins. ID Effective Group Num   1. [id number] 1995 Female Self [id number] 10/31/19 [facility]                                   [address]         DIAGNOSIS & PROCEDURE:                                                                                       .    Diagnosis:  R22.42  Leg mass, left   Operation:   Excision of left thigh lipoma  Location: [facility] ORTHOPEDIC AND SPINE [facility] AT [place] OR   Surgeon:    Sara Schwarz MD        [facility] INFORMATION:                                                                                    .    Surgeon's Scheduling Instruction:  elective  Requested Date: 22    OR Time: 9:55am          Patient Arrival Time: 8:30am  OR Time Required:  30  Minutes  Anesthesia:  MAC/TIVA  Equipment:                                                             SA Required:  Yes     Status:  Outpatient           Standard C-Arm:  No   PAT Required:   Yes                               Best Time to Call:  AM  Patient Requested to see PCP for Pre-op H & P:  No   Special Comments:                              Sara Schwarz MD    10/28/22 at 62:00 AM        PRE-CERTIFICATION INFORMATION:                                                                           .    Procedure:       CPT Code Modifier          61015

## 2022-11-01 NOTE — PROGRESS NOTES
[address] [facility] time ____0905________        Surgery time ____1035________    Take the following medications with a sip of water: Follow your MD/Surgeons pre-procedure instructions regarding your medications     Do not eat or drink anything after 12:00 midnight prior to your surgery. This includes water chewing gum, mints and ice chips. You may brush your teeth and gargle the morning of your surgery, but do not swallow the water     Please see your family doctor/pediatrician for a history and physical and/or concerning medications. Bring any test results/reports from your physicians office. If you are under the care of a heart doctor or specialist doctor, please be aware that you may be asked to them for clearance    You may be asked to stop blood thinners such as Coumadin, Plavix, Fragmin, Lovenox, etc., or any anti-inflammatories such as:  Aspirin, Ibuprofen, Advil, Naproxen prior to your surgery. We also ask that you stop any OTC medications such as fish oil, vitamin E, glucosamine, garlic, Multivitamins, COQ 10, etc.    We ask that you do not smoke 24 hours prior to surgery  We ask that you do not  drink any alcoholic beverages 24 hours prior to surgery     You must make arrangements for a responsible adult to take you home after your surgery. For your safety you will not be allowed to leave alone or drive yourself home. Your surgery will be cancelled if you do not have a ride home. Also for your safety, it is strongly suggested that someone stay with you the first 24 hours after your surgery. A parent or legal guardian must accompany a child scheduled for surgery and plan to stay at the hospital until the child is discharged. Please do not bring other children with you. For your comfort, please wear simple loose fitting clothing to the hospital.  Please do not bring valuables.     Do not wear any make-up or nail polish on your fingers or toes      For your safety, please do not wear any jewelry or body piercing's on the day of surgery. All jewelry must be removed. If you have dentures, they will be removed before going to operating room. For your convenience, we will provide you with a container. If you wear contact lenses or glasses, they will be removed, please bring a case for them. If you have a living will and a durable power of  for healthcare, please bring in a copy. As part of our patient safety program to minimize surgical site infections, we ask you to do the following:    Please notify your surgeon if you develop any illness between         now and the  day of your surgery. This includes a cough, cold, fever, sore throat, nausea,         or vomiting, and diarrhea, etc.   Please notify your surgeon if you experience dizziness, shortness         of breath or blurred vision between now and the time of your surgery. Do not shave your operative site 96 hours prior to surgery. For face and neck surgery, men may use an electric razor 48 hours   prior to surgery. You may shower the night before surgery or the morning of   your surgery with an antibacterial soap. You will need to bring a photo ID and insurance card    [facility] has an onsite pharmacy, would you like to utilize our pharmacy     If you will be staying overnight and use a C-pap machine, please bring   your C-pap to hospital     Our goal is to provide you with excellent care, therefore, visitors will be limited to two(2) in the room at a time so that we may focus on providing this care for you. Please contact pre-admission testing if you have any further questions. [facility] phone number:  [phone] PAT fax number:  [phone]  Please note these are generalized instructions for all surgical cases, you may be provided with more specific instructions according to your surgery.     C-Difficile admission screening and protocol:       * Admitted with diarrhea? [] YES    [x]  NO     *Prior history of C-Diff. In last 3 months? [] YES    [x]  NO     *Antibiotic use in the past 6-8 weeks? []  NO    [x]  YES                 If yes, which ANTIBIOTIC AND REASON_vaginal cyst_____     *Prior hospitalization or nursing home in the last month? []  YES    [x]  NO        SAFETY FIRST. .call before you fall

## 2022-11-08 ENCOUNTER — ANESTHESIA EVENT (OUTPATIENT)
Dept: OPERATING ROOM | Age: 27
End: 2022-11-08
Payer: MEDICAID

## 2022-11-09 ENCOUNTER — ANESTHESIA (OUTPATIENT)
Dept: OPERATING ROOM | Age: 27
End: 2022-11-09
Payer: MEDICAID

## 2022-11-09 ENCOUNTER — HOSPITAL ENCOUNTER (OUTPATIENT)
Age: 27
Setting detail: OUTPATIENT SURGERY
Discharge: HOME | End: 2022-11-09
Attending: SURGERY | Admitting: SURGERY
Payer: MEDICAID

## 2022-11-09 VITALS
OXYGEN SATURATION: 99 % | HEART RATE: 77 BPM | DIASTOLIC BLOOD PRESSURE: 63 MMHG | HEIGHT: 65 IN | SYSTOLIC BLOOD PRESSURE: 109 MMHG | BODY MASS INDEX: 30.53 KG/M2 | WEIGHT: 183.25 LBS | RESPIRATION RATE: 12 BRPM | TEMPERATURE: 97.3 F

## 2022-11-09 DIAGNOSIS — R22.42 LEG MASS, LEFT: ICD-10-CM

## 2022-11-09 DIAGNOSIS — D17.24 LIPOMA OF LEFT THIGH: Primary | ICD-10-CM

## 2022-11-09 PROBLEM — L72.3 SEBACEOUS CYST: Status: ACTIVE | Noted: 2022-11-09

## 2022-11-09 LAB — PREGNANCY, URINE: NEGATIVE

## 2022-11-09 PROCEDURE — 3700000001 HC ADD 15 MINUTES (ANESTHESIA): Performed by: SURGERY

## 2022-11-09 PROCEDURE — 2580000003 HC RX 258: Performed by: SURGERY

## 2022-11-09 PROCEDURE — 2709999900 HC NON-CHARGEABLE SUPPLY: Performed by: SURGERY

## 2022-11-09 PROCEDURE — 7100000011 HC PHASE II RECOVERY - ADDTL 15 MIN: Performed by: SURGERY

## 2022-11-09 PROCEDURE — 7100000001 HC PACU RECOVERY - ADDTL 15 MIN: Performed by: SURGERY

## 2022-11-09 PROCEDURE — 6360000002 HC RX W HCPCS: Performed by: NURSE ANESTHETIST, CERTIFIED REGISTERED

## 2022-11-09 PROCEDURE — 7100000000 HC PACU RECOVERY - FIRST 15 MIN: Performed by: SURGERY

## 2022-11-09 PROCEDURE — 6360000002 HC RX W HCPCS: Performed by: SURGERY

## 2022-11-09 PROCEDURE — 2500000003 HC RX 250 WO HCPCS: Performed by: SURGERY

## 2022-11-09 PROCEDURE — 7100000010 HC PHASE II RECOVERY - FIRST 15 MIN: Performed by: SURGERY

## 2022-11-09 PROCEDURE — A4217 STERILE WATER/SALINE, 500 ML: HCPCS | Performed by: SURGERY

## 2022-11-09 PROCEDURE — 3600000012 HC SURGERY LEVEL 2 ADDTL 15MIN: Performed by: SURGERY

## 2022-11-09 PROCEDURE — 3700000000 HC ANESTHESIA ATTENDED CARE: Performed by: SURGERY

## 2022-11-09 PROCEDURE — 3600000002 HC SURGERY LEVEL 2 BASE: Performed by: SURGERY

## 2022-11-09 PROCEDURE — 84703 CHORIONIC GONADOTROPIN ASSAY: CPT

## 2022-11-09 PROCEDURE — 2580000003 HC RX 258: Performed by: NURSE ANESTHETIST, CERTIFIED REGISTERED

## 2022-11-09 PROCEDURE — 88304 TISSUE EXAM BY PATHOLOGIST: CPT

## 2022-11-09 PROCEDURE — 2500000003 HC RX 250 WO HCPCS: Performed by: NURSE ANESTHETIST, CERTIFIED REGISTERED

## 2022-11-09 RX ORDER — DIPHENHYDRAMINE HYDROCHLORIDE 50 MG/ML
INJECTION INTRAMUSCULAR; INTRAVENOUS PRN
Status: DISCONTINUED | OUTPATIENT
Start: 2022-11-09 | End: 2022-11-09 | Stop reason: SDUPTHER

## 2022-11-09 RX ORDER — DIPHENHYDRAMINE HYDROCHLORIDE 50 MG/ML
12.5 INJECTION INTRAMUSCULAR; INTRAVENOUS
Status: DISCONTINUED | OUTPATIENT
Start: 2022-11-09 | End: 2022-11-09 | Stop reason: HOSPADM

## 2022-11-09 RX ORDER — FENTANYL CITRATE 50 UG/ML
INJECTION, SOLUTION INTRAMUSCULAR; INTRAVENOUS PRN
Status: DISCONTINUED | OUTPATIENT
Start: 2022-11-09 | End: 2022-11-09 | Stop reason: SDUPTHER

## 2022-11-09 RX ORDER — SODIUM CHLORIDE 0.9 % (FLUSH) 0.9 %
5-40 SYRINGE (ML) INJECTION PRN
Status: DISCONTINUED | OUTPATIENT
Start: 2022-11-09 | End: 2022-11-09 | Stop reason: HOSPADM

## 2022-11-09 RX ORDER — SODIUM CHLORIDE 9 MG/ML
INJECTION, SOLUTION INTRAVENOUS PRN
Status: DISCONTINUED | OUTPATIENT
Start: 2022-11-09 | End: 2022-11-09 | Stop reason: HOSPADM

## 2022-11-09 RX ORDER — HALOPERIDOL 5 MG/ML
1 INJECTION INTRAMUSCULAR
Status: DISCONTINUED | OUTPATIENT
Start: 2022-11-09 | End: 2022-11-09 | Stop reason: HOSPADM

## 2022-11-09 RX ORDER — FENTANYL CITRATE 50 UG/ML
50 INJECTION, SOLUTION INTRAMUSCULAR; INTRAVENOUS EVERY 5 MIN PRN
Status: DISCONTINUED | OUTPATIENT
Start: 2022-11-09 | End: 2022-11-09 | Stop reason: HOSPADM

## 2022-11-09 RX ORDER — ONDANSETRON 2 MG/ML
4 INJECTION INTRAMUSCULAR; INTRAVENOUS
Status: DISCONTINUED | OUTPATIENT
Start: 2022-11-09 | End: 2022-11-09 | Stop reason: HOSPADM

## 2022-11-09 RX ORDER — MEPERIDINE HYDROCHLORIDE 25 MG/ML
12.5 INJECTION INTRAMUSCULAR; INTRAVENOUS; SUBCUTANEOUS
Status: DISCONTINUED | OUTPATIENT
Start: 2022-11-09 | End: 2022-11-09 | Stop reason: HOSPADM

## 2022-11-09 RX ORDER — SODIUM CHLORIDE 0.9 % (FLUSH) 0.9 %
5-40 SYRINGE (ML) INJECTION EVERY 12 HOURS SCHEDULED
Status: DISCONTINUED | OUTPATIENT
Start: 2022-11-09 | End: 2022-11-09 | Stop reason: HOSPADM

## 2022-11-09 RX ORDER — LIDOCAINE HYDROCHLORIDE 20 MG/ML
INJECTION, SOLUTION EPIDURAL; INFILTRATION; INTRACAUDAL; PERINEURAL PRN
Status: DISCONTINUED | OUTPATIENT
Start: 2022-11-09 | End: 2022-11-09 | Stop reason: SDUPTHER

## 2022-11-09 RX ORDER — OXYCODONE HYDROCHLORIDE 5 MG/1
5 TABLET ORAL
Status: DISCONTINUED | OUTPATIENT
Start: 2022-11-09 | End: 2022-11-09 | Stop reason: HOSPADM

## 2022-11-09 RX ORDER — LIDOCAINE HYDROCHLORIDE AND EPINEPHRINE 15; 5 MG/ML; UG/ML
INJECTION, SOLUTION EPIDURAL
Status: COMPLETED | OUTPATIENT
Start: 2022-11-09 | End: 2022-11-09

## 2022-11-09 RX ORDER — DEXAMETHASONE SODIUM PHOSPHATE 4 MG/ML
INJECTION, SOLUTION INTRA-ARTICULAR; INTRALESIONAL; INTRAMUSCULAR; INTRAVENOUS; SOFT TISSUE PRN
Status: DISCONTINUED | OUTPATIENT
Start: 2022-11-09 | End: 2022-11-09 | Stop reason: SDUPTHER

## 2022-11-09 RX ORDER — SODIUM CHLORIDE 9 MG/ML
INJECTION, SOLUTION INTRAVENOUS CONTINUOUS
Status: DISCONTINUED | OUTPATIENT
Start: 2022-11-09 | End: 2022-11-09 | Stop reason: HOSPADM

## 2022-11-09 RX ORDER — PROPOFOL 10 MG/ML
INJECTION, EMULSION INTRAVENOUS CONTINUOUS PRN
Status: DISCONTINUED | OUTPATIENT
Start: 2022-11-09 | End: 2022-11-09 | Stop reason: SDUPTHER

## 2022-11-09 RX ORDER — ONDANSETRON 2 MG/ML
INJECTION INTRAMUSCULAR; INTRAVENOUS PRN
Status: DISCONTINUED | OUTPATIENT
Start: 2022-11-09 | End: 2022-11-09 | Stop reason: SDUPTHER

## 2022-11-09 RX ORDER — TRAMADOL HYDROCHLORIDE 50 MG/1
50 TABLET ORAL EVERY 6 HOURS PRN
Qty: 15 TABLET | Refills: 0 | Status: SHIPPED | OUTPATIENT
Start: 2022-11-09 | End: 2022-11-16

## 2022-11-09 RX ORDER — SODIUM CHLORIDE 9 MG/ML
INJECTION, SOLUTION INTRAVENOUS CONTINUOUS PRN
Status: DISCONTINUED | OUTPATIENT
Start: 2022-11-09 | End: 2022-11-09 | Stop reason: SDUPTHER

## 2022-11-09 RX ORDER — MIDAZOLAM HYDROCHLORIDE 1 MG/ML
INJECTION INTRAMUSCULAR; INTRAVENOUS PRN
Status: DISCONTINUED | OUTPATIENT
Start: 2022-11-09 | End: 2022-11-09 | Stop reason: SDUPTHER

## 2022-11-09 RX ORDER — FAMOTIDINE 10 MG/ML
INJECTION, SOLUTION INTRAVENOUS PRN
Status: DISCONTINUED | OUTPATIENT
Start: 2022-11-09 | End: 2022-11-09 | Stop reason: SDUPTHER

## 2022-11-09 RX ORDER — LORAZEPAM 2 MG/ML
0.5 INJECTION INTRAMUSCULAR
Status: DISCONTINUED | OUTPATIENT
Start: 2022-11-09 | End: 2022-11-09 | Stop reason: HOSPADM

## 2022-11-09 RX ORDER — BUPIVACAINE HYDROCHLORIDE 5 MG/ML
INJECTION, SOLUTION EPIDURAL; INTRACAUDAL
Status: COMPLETED | OUTPATIENT
Start: 2022-11-09 | End: 2022-11-09

## 2022-11-09 RX ORDER — MAGNESIUM HYDROXIDE 1200 MG/15ML
LIQUID ORAL CONTINUOUS PRN
Status: COMPLETED | OUTPATIENT
Start: 2022-11-09 | End: 2022-11-09

## 2022-11-09 RX ADMIN — SODIUM CHLORIDE: 9 INJECTION, SOLUTION INTRAVENOUS at 08:58

## 2022-11-09 RX ADMIN — DEXAMETHASONE SODIUM PHOSPHATE 8 MG: 4 INJECTION, SOLUTION INTRAMUSCULAR; INTRAVENOUS at 09:16

## 2022-11-09 RX ADMIN — ONDANSETRON 4 MG: 2 INJECTION INTRAMUSCULAR; INTRAVENOUS at 09:16

## 2022-11-09 RX ADMIN — FENTANYL CITRATE 25 MCG: 50 INJECTION INTRAMUSCULAR; INTRAVENOUS at 09:32

## 2022-11-09 RX ADMIN — DIPHENHYDRAMINE HYDROCHLORIDE 12.5 MG: 50 INJECTION, SOLUTION INTRAMUSCULAR; INTRAVENOUS at 09:34

## 2022-11-09 RX ADMIN — FENTANYL CITRATE 50 MCG: 50 INJECTION INTRAMUSCULAR; INTRAVENOUS at 09:16

## 2022-11-09 RX ADMIN — CEFAZOLIN 2000 MG: 2 INJECTION, POWDER, FOR SOLUTION INTRAMUSCULAR; INTRAVENOUS at 09:16

## 2022-11-09 RX ADMIN — MIDAZOLAM 2 MG: 1 INJECTION INTRAMUSCULAR; INTRAVENOUS at 09:10

## 2022-11-09 RX ADMIN — FENTANYL CITRATE 25 MCG: 50 INJECTION INTRAMUSCULAR; INTRAVENOUS at 09:30

## 2022-11-09 RX ADMIN — PROPOFOL 180 MCG/KG/MIN: 10 INJECTION, EMULSION INTRAVENOUS at 09:16

## 2022-11-09 RX ADMIN — FAMOTIDINE 20 MG: 10 INJECTION, SOLUTION INTRAVENOUS at 09:16

## 2022-11-09 RX ADMIN — LIDOCAINE HYDROCHLORIDE 80 MG: 20 INJECTION, SOLUTION EPIDURAL; INFILTRATION; INTRACAUDAL; PERINEURAL at 09:16

## 2022-11-09 ASSESSMENT — LIFESTYLE VARIABLES: SMOKING_STATUS: 1

## 2022-11-09 ASSESSMENT — ENCOUNTER SYMPTOMS: SHORTNESS OF BREATH: 0

## 2022-11-09 ASSESSMENT — PAIN - FUNCTIONAL ASSESSMENT: PAIN_FUNCTIONAL_ASSESSMENT: 0-10

## 2022-11-09 NOTE — H&P
Update History & Physical    The patient's History and Physical from my office visit on October 27, 2022 was reviewed with the patient and I examined the patient. There was no change. The surgical site was confirmed by the patient and me. Left thigh lipoma marked. Plan: The risks, benefits, expected outcome, and alternative to the recommended procedure have been discussed with the patient. Patient understands and wants to proceed with the procedure. Will proceed with excision of left thigh lipoma. Ancef ordered. Bilateral SCDs.     Electronically signed by Natasha Hartmann MD on 11/9/2022 at 8:46 AM

## 2022-11-09 NOTE — PROGRESS NOTES
CLINICAL PHARMACY NOTE: MEDS TO BEDS    Total # of Prescriptions Filled: 1   The following medications were delivered to the patient:  Current Discharge Medication List        START taking these medications    Details   traMADol (ULTRAM) 50 MG tablet Take 1 tablet by mouth every 6 hours as needed for Pain for up to 7 days. Intended supply: 7 days.  Take lowest dose possible to manage pain  Qty: 15 tablet, Refills: 0    Comments: Reduce doses taken as pain becomes manageable  Associated Diagnoses: Lipoma of left thigh               Additional Documentation:

## 2022-11-09 NOTE — PROGRESS NOTES
Pt arrived to PACU from OR. Pt asleep on 2l/nc. Left thigh incision YESENIA with surgical glue noted.

## 2022-11-09 NOTE — DISCHARGE INSTRUCTIONS
Landon Jang MD     General and Vascular Surgery   Linn Nyhan, MD     [address] [facility] [address]     [address] [address], [place] [facility]  Yobaniestefania Lora, APRN - CNP   Phone: [phone]           Fax: [phone]                                               POST-OPERATIVE INSTRUCTIONS FOR CYST REMOVAL    Call the office to schedule your post-operative appointment with your surgeon for two (2) weeks. You will have water occlusive glue closing your incision(s). You may shower. Wash incision(s) gently, and pat dry. Do not rub your incision(s). Do not soak incision(s) in tub baths, hot tubs or pools    General guidelines for activity: It is OK to be up walking around; walking up and down stairs is also OK. Do what is comfortable: stop and rest when you feel tired. Drink plenty of fluids after surgery. Do NOT drive while taking your narcotic pain medicine. Watch for signs of infection:   Fever over 100.5°   Excessive warmth or bright redness around your incision(s)  Leakage of bloody or cloudy fluid from your incision(s)    You will have pain medicine ordered. Take as directed. If you experience constipation  Increase your water intake, and activity; walking is best.  A stool softener or mild laxative may be necessary if you still have not had a bowel  movement ; call the office for further instructions.

## 2022-11-09 NOTE — ANESTHESIA PRE PROCEDURE
Department of Anesthesiology  Preprocedure Note       Name:  Caitie Rodriguez   Age:  32 y.o.  :  1995                                          MRN:  [id number]         Date:  2022      Surgeon: Altaf Milligan):  Martín Allen MD    Procedure: Procedure(s):  EXCISION OF LEFT THIGH LIPOMA    Medications prior to admission:   Prior to Admission medications    Medication Sig Start Date End Date Taking? Authorizing Provider   traMADol (ULTRAM) 50 MG tablet Take 1 tablet by mouth every 6 hours as needed for Pain for up to 7 days. Intended supply: 7 days. Take lowest dose possible to manage pain 22 Yes Martín Allen MD       Current medications:    Current Facility-Administered Medications   Medication Dose Route Frequency Provider Last Rate Last Admin    0.9 % sodium chloride infusion   IntraVENous Continuous Malinda Matthews MD        sodium chloride flush 0.9 % injection 5-40 mL  5-40 mL IntraVENous 2 times per day Malinda Matthews MD        sodium chloride flush 0.9 % injection 5-40 mL  5-40 mL IntraVENous PRN Malinda Matthews MD        0.9 % sodium chloride infusion   IntraVENous PRN Malinda Matthews MD        ceFAZolin (ANCEF) 2,000 mg in dextrose 5 % 50 mL IVPB (mini-bag)  2,000 mg IntraVENous On Call to [address] Espinoza MD         Facility-Administered Medications Ordered in Other Encounters   Medication Dose Route Frequency Provider Last Rate Last Admin    0.9 % sodium chloride infusion   IntraVENous Continuous PRN [place] Perdomo - CRNA   New Bag at 22 5946       Allergies:     Allergies   Allergen Reactions    Azithromycin Nausea And Vomiting              Problem List:    Patient Active Problem List   Diagnosis Code    Anxiety state F41.1    Depressive disorder, not elsewhere classified F32.89    Esophageal reflux K21.9    Anxiety F41.9    Leg mass, left R22.42    Acute appendicitis K35.80       Past Medical History:        Diagnosis Date    Anxiety  Contact lens/glasses fitting     Gallbladder polyp     Infected cyst of Bartholin's gland duct     Ovarian cyst     PONV (postoperative nausea and vomiting)        Past Surgical History:        Procedure Laterality Date    ESOPHAGOSCOPY      LAPAROSCOPIC APPENDECTOMY N/A 10/20/2021    LAPAROSCOPIC APPENDECTOMY performed by Mir Mi MD at [address] EXTRACTION Bilateral        Social History:    Social History     Tobacco Use    Smoking status: Former     Types: E-Cigarettes    Smokeless tobacco: Never   Substance Use Topics    Alcohol use: Yes     Comment: 2 drinks per weekend                                Counseling given: Not Answered      Vital Signs (Current):   Vitals:    11/01/22 1524 11/09/22 0842   BP:  (!) 125/90   Pulse:  60   Resp:  16   SpO2:  99%   Weight: 166 lb (75.3 kg) 183 lb 3.9 oz (83.1 kg)   Height: 5' 5\" (1.651 m) 5' 5\" (1.651 m)                                              BP Readings from Last 3 Encounters:   11/09/22 (!) 125/90   10/27/22 119/87   10/13/22 122/72       NPO Status: Time of last liquid consumption: 2330                        Time of last solid consumption: 2330                        Date of last liquid consumption: 11/08/22                        Date of last solid food consumption: 11/08/22    BMI:   Wt Readings from Last 3 Encounters:   11/09/22 183 lb 3.9 oz (83.1 kg)   10/27/22 166 lb (75.3 kg)   10/13/22 166 lb 8 oz (75.5 kg)     Body mass index is 30.49 kg/m².     CBC:   Lab Results   Component Value Date/Time    WBC 5.5 10/13/2022 11:58 AM    RBC 4.29 10/13/2022 11:58 AM    HGB 12.0 10/13/2022 11:58 AM    HCT 36.6 10/13/2022 11:58 AM    MCV 85.3 10/13/2022 11:58 AM    RDW 14.6 10/13/2022 11:58 AM     10/13/2022 11:58 AM       CMP:   Lab Results   Component Value Date/Time     10/13/2022 11:58 AM    K 4.4 10/13/2022 11:58 AM    K 4.2 10/19/2021 05:11 PM     10/13/2022 11:58 AM    CO2 24 10/13/2022 11:58 AM    BUN 18 10/13/2022 11:58 AM    CREATININE 0.8 10/13/2022 11:58 AM    GFRAA >60 10/13/2022 11:58 AM    AGRATIO 2.2 10/13/2022 11:58 AM    LABGLOM >60 10/13/2022 11:58 AM    GLUCOSE 94 10/13/2022 11:58 AM    PROT 6.7 10/13/2022 11:58 AM    CALCIUM 9.2 10/13/2022 11:58 AM    BILITOT 0.3 10/13/2022 11:58 AM    ALKPHOS 44 10/13/2022 11:58 AM    AST 11 10/13/2022 11:58 AM    ALT 13 10/13/2022 11:58 AM       POC Tests: No results for input(s): POCGLU, POCNA, POCK, POCCL, POCBUN, POCHEMO, POCHCT in the last 72 hours. Coags: No results found for: PROTIME, INR, APTT    HCG (If Applicable):   Lab Results   Component Value Date    PREGTESTUR Negative 11/09/2022        ABGs: No results found for: PHART, PO2ART, YRH4ZML, BCR6VET, BEART, S5NKUBFW     Type & Screen (If Applicable):  No results found for: LABABO, LABRH    Drug/Infectious Status (If Applicable):  No results found for: HIV, HEPCAB    COVID-19 Screening (If Applicable):   Lab Results   Component Value Date/Time    COVID19 Not Detected 10/19/2021 08:58 PM           Anesthesia Evaluation  Patient summary reviewed   history of anesthetic complications: PONV. Airway: Mallampati: I  TM distance: >3 FB   Neck ROM: full  Mouth opening: > = 3 FB   Dental: normal exam         Pulmonary:   (+) current smoker    (-) shortness of breath          Patient did not smoke on day of surgery. Cardiovascular:Negative CV ROS        (-) pacemaker, past MI, CABG/stent and  angina       Beta Blocker:  Not on Beta Blocker         Neuro/Psych:   Negative Neuro/Psych ROS     (-) seizures and CVA           GI/Hepatic/Renal:   (+) GERD:,      (-) liver disease and no renal disease       Endo/Other: Negative Endo/Other ROS       (-) diabetes mellitus, hypothyroidism, hyperthyroidism               Abdominal:             Vascular: negative vascular ROS.          Other Findings: Right tragus piercing          Anesthesia Plan      MAC     ASA 2       Induction: intravenous. Anesthetic plan and risks discussed with patient. Plan discussed with CRNA. This pre-anesthesia assessment may be used as a history and physical.    DOS STAFF ADDENDUM:    Pt seen and examined, chart reviewed (including anesthesia, drug and allergy history). No interval changes to history and physical examination. Anesthetic plan, risks, benefits, alternatives, and personnel involved discussed with patient. Patient verbalized an understanding and agrees to proceed.       Nika Bergman MD  November 9, 2022  9:06 AM

## 2022-11-09 NOTE — OP NOTE
Operative Report      Patient: Leonette Holter MRN: [id number]     YOB: 1995  Age: 32 y.o. Sex: female        Primary Care Physician: KAITLIN Loaiza         DATE OF OPERATION: 11/9/2022     PREOPERATIVE DIAGNOSIS: left thigh lipoma    POSTOPERATIVE DIAGNOSIS: left thigh sebaceous cyst    PROCEDURE PERFORMED: excision of left thigh sebaceous cyst measuring approximately 2.5 x 2.5 cm    SURGEON: Rustam Mares MD    ANESTHESIA: Monitor Anesthesia Care    ASA CLASS: 2    DVT PROPHYLAXIS: bilateral SCDs    ANTIBIOTICS: ancef 2g IV preoperatively    INDICATIONS: Leonette Holter presented with an enlarging and symptomatic mass located in the left anterior thigh. The risks, benefits, and alternatives were explained to the patient and she was willing to consent for the procedure. Procedure Details:       After informed consent was obtained, the patient was  brought to the operating room and placed in the supine position. She was prepped and draped in the usual sterile fashion. A timeout was then performed. We first started by injecting a combination of 1.5% lidocaine with epinephrine and 0.5% marcaine over the lesion. We made a transverse incision the scalpel to the subcutaneous tissue, and immediately entered the cyst.  It was fully decompressed to be able to manipulate the cyst wall. The cyst wall was then carefully dissected free from the subcutaneous tissue and removed in its entirety. It measured 2.5 by 2.5 cm. The wound was then irrigated and expected. No residual cyst wall was left. Electrocautery was used for hemostasis. The wound was then closed with 3-0 vicryl sutures and a running 4-0 vicryl subcuticular layer. Dermabond was placed over the incision. The patient tolerated the procedure well and was taken to the recovery room in stable condition.     Findings: 2.5 x 2.5 cm left anterior thigh sebaceous cyst    Estimated Blood Loss: Minimal    Complications: none Specimen: 1.   Sebaceous cyst of the left anterior thigh                  Electronically signed by Andrey uMñoz MD on 11/9/2022 at 9:48 AM

## 2022-11-10 ENCOUNTER — TELEPHONE (OUTPATIENT)
Dept: SURGERY | Age: 27
End: 2022-11-10

## 2022-11-10 NOTE — LETTER
[address] [facility]  [address]  [facility] [address]  Phone: [phone]  Fax: [phone]    Luda Mckeon MD        November 10, 2022     Patient: Felicia Ravi   YOB: 1995   Date of Visit: 11/10/2022       To Whom It May Concern:     Mary Jo Ayala had outpatient surgery yesterday, 11-9-2022. Due to her healing process, please allow her to wear sweat pants or leggings instead of jeans. If you have any questions or concerns, please don't hesitate to call. Sincerely,    .     Luda Mckeon MD

## 2022-11-10 NOTE — TELEPHONE ENCOUNTER
Pt had surgery yesterday on left leg. Her employer requires she wear jeans to work, but she thinks they will rub on the sutures and become irritated. Is it possible to get a letter stating she needs to wear either sweat pants or leggings?

## 2022-11-10 NOTE — LETTER
[address] [facility]  [address] [place] [address]  [facility] [address]  Phone: [phone]  Fax: [phone]    Piyush Soto MD        November 10, 2022     Patient: Sarah Waller   YOB: 1995   Date of Visit: 11/10/2022       To Whom It May Concern:    Please excuse Roosevelt Tanner from work until Monday 11-14-22. She may return to work on Monday with no restrictions. If you have any questions or concerns, please don't hesitate to call.     Sincerely,        Piyush Soto MD

## 2022-11-28 ENCOUNTER — OFFICE VISIT (OUTPATIENT)
Dept: SURGERY | Age: 27
End: 2022-11-28

## 2022-11-28 DIAGNOSIS — L72.3 SEBACEOUS CYST: ICD-10-CM

## 2022-11-28 DIAGNOSIS — R22.42 LEG MASS, LEFT: Primary | ICD-10-CM

## 2022-11-28 PROCEDURE — 99024 POSTOP FOLLOW-UP VISIT: CPT | Performed by: SURGERY

## 2023-06-27 DIAGNOSIS — R59.1 LYMPHADENOPATHY: ICD-10-CM

## 2023-06-27 LAB
FERRITIN SERPL IA-MCNC: 72.2 NG/ML (ref 15–150)
FOLATE SERPL-MCNC: 3.93 NG/ML (ref 4.78–24.2)
HCT VFR BLD AUTO: 35.8 % (ref 36–48)
IMMATURE RETIC FRACT: 0.42 (ref 0.21–0.37)
IRON SATN MFR SERPL: 12 % (ref 15–50)
IRON SERPL-MCNC: 39 UG/DL (ref 37–145)
PATH INTERP BLD-IMP: NORMAL
RETICS # AUTO: 0.07 M/UL (ref 0.02–0.1)
RETICS/RBC NFR AUTO: 1.6 % (ref 0.5–2.18)
TIBC SERPL-MCNC: 326 UG/DL (ref 260–445)
VIT B12 SERPL-MCNC: 339 PG/ML (ref 211–911)

## 2023-06-28 LAB — PATH INTERP BLD-IMP: NORMAL

## 2024-04-06 NOTE — ED TRIAGE NOTES
[facility] Hospitalist Discharge Summary       ADMIT DATE: 3/29/2024     DISCHARGE DATE: ***     PCP: Taqueria Chamberlain MD   DATE PCP NOTIFIED:  ***  METHOD OF NOTIFICATION: ***    CONSULTING PHYSICIAN(s):   ***    CONDITION ON DISCHARGE: ***     DISCHARGE DIAGNOSIS:   ***Acute hypoxic, hypercapnic respiratory failure multifactorial: acute viral bronchitis with bronchospasms due to metapneumovirus & flash pulmonary edema   -(3/28) CT chest as above  -CXR's as above  -(3/29) CTA chest pulmonary embolism as above  -(3/30) TTE LVEF 60% , G1DD  -ABG/VBG's as above  -BNP 1,542  -S/p BiPAP: Reports she cannot tolerate  -Continue oxygen & wean as tolerated  -Will need walking O2 test before DC  -Baseline: room air   -Monitor I&O's, daily weights, signs of fluid overload  -Continue PO lasix  -Cardiology consulted  -Pulmonology consulted     Acute viral bronchitis with bronchospasms due to metapneumovirus  -Lactic acid 0.9  -Procalcitonin <0.05  -Completed azithromycin x4 days  -Completed ceftriaxone   -Continue duonebs, guaifenesin, tesslon perles  -Stopped budesonide nebs & 3% saline nebs   -Stop solu medrol & start prednisone  -Strongly encouraged IS & acapella use     Abnormal UA, UTI ruled out   -Urine culture negative for growth      Complete heart block s/p pacemaker implant  -Patient to follow-up with EP Dr. Clayton     Primary hypertension  -Continue metoprolol succinate, valsartan  -Start on nifedipine  -Monitor vitals     Incidental finding on CTA chest of dense calcification in the spinal canal at the T6 level.   -CTA chest as above  -Currently denying back pain      H/o Sick sinus syndrome   PVD  Venous insufficiency  H/o endometrial CA stage 1 s/p TAHBSO- No acute concerns  Hyperparathyroidism   GERD- Started pepcid  Kidney stones  OA  Generalized anxiety- Continue lorazepam  Insomnia  Obesity       CODE STATUS:     Code Status: Full Resuscitation     PROCEDURES PERFORMED DURING ADMISSION AND DATES  Patient to the ED via EMS, states she woke up with RLQ abdominal pain at 5p, +nausea, no vomiting. Patient awake and alert, appears to be resting comfortably in bed upon initial assessment. Patient easily conversing with RN, no visual signs of distress, VSS. Unlabored breathing, no grimacing. Speaking and laughing with transport technician about her tattoos and what they mean. Patient to [address] [facility] [facility] via stretcher at this time. Medicated for pain prior to US, see MAR. PERFORMED:  ***    HOSPITAL COURSE:   ***Acute hypoxic, hypercapnic respiratory failure multifactorial: acute viral bronchitis with bronchospasms due to metapneumovirus & flash pulmonary edema   -(3/28) CT chest as above  -CXR's as above  -(3/29) CTA chest pulmonary embolism as above  -(3/30) TTE LVEF 60% , G1DD  -ABG/VBG's as above  -BNP 1,542  -S/p BiPAP: Reports she cannot tolerate  -Continue oxygen & wean as tolerated  -Will need walking O2 test before DC  -Baseline: room air   -Monitor I&O's, daily weights, signs of fluid overload  -Continue PO lasix  -Cardiology consulted  -Pulmonology consulted     Acute viral bronchitis with bronchospasms due to metapneumovirus  -Lactic acid 0.9  -Procalcitonin <0.05  -Completed azithromycin x4 days  -Completed ceftriaxone   -Continue duonebs, guaifenesin, tesslon perles  -Stopped budesonide nebs & 3% saline nebs   -Stop solu medrol & start prednisone  -Strongly encouraged IS & acapella use     Abnormal UA, UTI ruled out   -Urine culture negative for growth      Complete heart block s/p pacemaker implant  -Patient to follow-up with EP Dr. Clayton     Primary hypertension  -Continue metoprolol succinate, valsartan  -Start on nifedipine  -Monitor vitals     Incidental finding on CTA chest of dense calcification in the spinal canal at the T6 level.   -CTA chest as above  -Currently denying back pain      H/o Sick sinus syndrome   PVD  Venous insufficiency  H/o endometrial CA stage 1 s/p TAHBSO- No acute concerns  Hyperparathyroidism   GERD- Started pepcid  Kidney stones  OA  Generalized anxiety- Continue lorazepam  Insomnia  Obesity       ALLERGIES:  ALLERGIES:   Allergen Reactions    Ciprofloxacin HIVES    Hydrochlorothiazide Other (See Comments)    Penicillins HIVES    Sulfa Antibiotics HIVES    Vancomycin Other (See Comments)        DISCHARGE MEDICATION LIST:     Summary of your Discharge Medications        Take these Medications        Details   * albuterol 108 (90 Base)  MCG/ACT inhaler   Inhale 2 puffs into the lungs every 4 hours as needed for Shortness of Breath or Wheezing.     * albuterol 108 (90 Base) MCG/ACT inhaler   Inhale 2 puffs into the lungs every 4 hours as needed for Shortness of Breath or Wheezing.     benzonatate 200 MG capsule  Commonly known as: TESSALON PERLES   Take 1 capsule by mouth in the morning and 1 capsule at noon and 1 capsule in the evening.     furosemide 20 MG tablet  Commonly known as: LASIX   Take 1 tablet by mouth daily as needed (For weight gain of 2lbs in 1 day, 5lbs in 1 week, or worsening lower extremity edema).     GuaiFENesin 1200 MG 12 hr tablet   Take 1 tablet by mouth every 12 hours.     LORazepam 0.5 MG tablet  Commonly known as: ATIVAN   Take 1.5 mg by mouth nightly.     metoPROLOL succinate 50 MG 24 hr tablet  Commonly known as: TOPROL-XL  Start taking on: April 7, 2024   Take 1 tablet by mouth daily. Do not start before April 7, 2024.     NIFEdipine XL 30 MG 24 hr tablet  Commonly known as: PROCARDIA XL   Take 1 tablet by mouth in the morning and 1 tablet in the evening.     nystatin 204026 UNIT/GM powder  Commonly known as: MYCOSTATIN   Apply topically every 12 hours.     predniSONE 10 MG tablet  Commonly known as: DELTASONE  Start taking on: April 6, 2024   Take 4 tablets by mouth daily for 5 days, THEN 3 tablets daily for 5 days, THEN 2 tablets daily for 5 days, THEN 1 tablet daily for 5 days, THEN 0.5 tablets daily for 5 days. 40mg x4days, 30mg x4days, 20mg x4days, 10mg x 4days     Slow Fe 142 (45 Fe) MG Tab CR   Generic drug: Ferrous Sulfate  Patient states her PCP from [place] told her to take every other day or it could lead to Heart Failure. Patient states taking it only 3x in last 6 months.     valsartan 160 MG tablet  Commonly known as: DIOVAN  Start taking on: April 7, 2024   Take 1 tablet by mouth daily. Do not start before April 7, 2024.           * This list has 2 medication(s) that are the same as other medications prescribed  for you. Read the directions carefully, and ask your doctor or other care provider to review them with you.                   NEW MEDICATIONS / MEDICATIONS DISCONTINUED / DOSAGE CHANGES DURING THIS ADMISSION:  ***    DISCHARGE INSTRUCTIONS  DISCHARGE To: ***  DIET: ***  ACTIVITY: ***  HOME HEALTH CARE RN/PT/OT/MSW/Wound/Ostomy Agency: ***  HOME OXYGEN: ***    PENDING TEST RESULTS AND PROBLEMS NEEDING F/U:  ***    FOLLOW-UP APPOINTMENTS:   No follow-ups on file.     PHYSICAL EXAMINATION:   Physical Exam     Visit Vitals  BP (!) 153/58   Pulse 90   Temp 97.2 °F (36.2 °C) (Temporal)   Resp 19   Ht 5' 7\" (1.702 m)   Wt 100.9 kg (222 lb 7.1 oz)   SpO2 96%   BMI 34.84 kg/m²        Recent Results (from the past 24 hour(s))   Sodium, Urine    Collection Time: 04/05/24 10:26 PM   Result Value Ref Range    Sodium, Urine 23 mmol/L   Basic Metabolic Panel    Collection Time: 04/06/24  4:50 AM   Result Value Ref Range    Fasting Status      Sodium 136 135 - 145 mmol/L    Potassium 4.5 3.4 - 5.1 mmol/L    Chloride 99 97 - 110 mmol/L    Carbon Dioxide 31 21 - 32 mmol/L    Anion Gap 11 7 - 19 mmol/L    Glucose 119 (H) 70 - 99 mg/dL    BUN 29 (H) 6 - 20 mg/dL    Creatinine 0.62 0.51 - 0.95 mg/dL    Glomerular Filtration Rate 88 >=60    BUN/Cr 47 (H) 7 - 25    Calcium 8.7 8.4 - 10.2 mg/dL   CBC with Automated Differential (performable only)    Collection Time: 04/06/24  4:50 AM   Result Value Ref Range    WBC 8.3 4.2 - 11.0 K/mcL    RBC 5.05 4.00 - 5.20 mil/mcL    HGB 15.1 12.0 - 15.5 g/dL    HCT 45.5 36.0 - 46.5 %    MCV 90.1 78.0 - 100.0 fl    MCH 29.9 26.0 - 34.0 pg    MCHC 33.2 32.0 - 36.5 g/dL    RDW-CV 12.5 11.0 - 15.0 %    RDW-SD 41.1 39.0 - 50.0 fL     140 - 450 K/mcL    NRBC 0 <=0 /100 WBC    Neutrophil, Percent 73 %    Lymphocytes, Percent 15 %    Mono, Percent 10 %    Eosinophils, Percent 0 %    Basophils, Percent 0 %    Immature Granulocytes 2 %    Absolute Neutrophils 6.0 1.8 - 7.7 K/mcL    Absolute Lymphocytes  1.3 1.0 - 4.0 K/mcL    Absolute Monocytes 0.9 0.3 - 0.9 K/mcL    Absolute Eosinophils  0.0 0.0 - 0.5 K/mcL    Absolute Basophils 0.0 0.0 - 0.3 K/mcL    Absolute Immature Granulocytes 0.1 0.0 - 0.2 K/mcL        LAST ECHO/ECHO STRESS:  === 24 ===     TRANSTHORACIC ECHO(TTE) COMPLETE W/ W/O IMAGING AGENT     - Narrative -  *[facility]*  [address] [address]  [phone]  Transthoracic Echocardiogram (TTE)     Patient: Shania James     Study Date/Time:      Mar 30 2024 11:38AM  MRN:     [id number]             FIN#:                 [id number]  :     1942           Ht/Wt:                170.2cm 105.7kg  Age:     81                   BSA/BMI:              2.28m^2 36.5kg/m^2  Gender:  F                    Baseline BP:          146 / 77  Ordering Physician:   Rasheeda Rodríguez MD, FACC, RPVI, ABVL     Referring Physician:  Rasheeda Rodríguez     Attending Physician:  Bob Mayer  Performing Physician: Rasheeda Rodríguez MD, FACLEATHA, RPVI, ABVL  Diagnostic Physician: Alessandro Mendoza MD  Sonographer:          ALEXIA Colon     ------------------------------------------------------------------------------  INDICATIONS:  Dyspnea on exertion.     ------------------------------------------------------------------------------  STUDY CONCLUSIONS  SUMMARY:     1. Left ventricle: The cavity size is normal. Wall thickness is normal.  Systolic function is normal. The ejection fraction was measured by biplane  method of disks. Doppler parameters are consistent with abnormal left  ventricular relaxation (grade 1 diastolic dysfunction). The ejection  fraction is 60%.  2. Ventricular septum: Abnormal septal motion. Septal motion is mildly  dyssynergic. These changes are consistent with right ventricular pacing.  3. Aortic valve: There is mild regurgitation.  4. Left atrium: The atrium is moderately dilated.  5. Right ventricle: The cavity size is normal. Wall thickness is  normal.  Systolic function is normal. Pacemaker lead noted in right ventricle.  6. Right atrium: Pacemaker lead noted in right atrium.  7. Pulmonary arteries: Systolic pressure is mildly increased, estimated to be  40mm Hg.     ------------------------------------------------------------------------------  HISTORY:   PMH:  ICD.  STUDY DATA:   Procedure:  A transthoracic echocardiogram was performed. Image  quality was good.  M-mode, complete 2D, complete spectral Doppler, and color  Doppler.  Study status:  Routine.  Study completion:  There were no  complications.     FINDINGS     LEFT VENTRICLE:  The cavity size is normal. Wall thickness is normal. Systolic  function is normal. Wall motion is normal; there are no regional wall motion  abnormalities.    The ejection fraction was measured by biplane method of  disks. The ejection fraction is 60%. Doppler parameters are consistent with  abnormal left ventricular relaxation (grade 1 diastolic dysfunction).     AORTIC VALVE:  The annulus is normal. The valve is structurally normal. The  valve is trileaflet. The leaflets are normal thickness. Cusp separation is  normal. Velocity is within the normal range. There is no stenosis. There is  mild regurgitation. The mean systolic gradient is 6mm Hg. The peak systolic  gradient is 11mm Hg. The LVOT to aortic valve VTI ratio is 0.79. The valve  area is 2.6cm^2. The valve area index is 1.15cm^2/m^2. The ratio of LVOT to  aortic valve peak velocity is 0.87. The valve area is 2.7cm^2. The valve area  index is 1.19cm^2/m^2.     AORTA:  Aortic root: The root is normal-sized.  Ascending aorta: The vessel is normal-sized.     MITRAL VALVE:  The valve is structurally normal. The annulus is normal. The  leaflets are normal thickness. Leaflet separation is normal. Inflow velocity  is within the normal range. There is no evidence for stenosis. There is mild  regurgitation. The peak diastolic gradient is 4mm Hg. The valve area  by  pressure half-time is 2.0cm^2. The valve area index by pressure half-time is  0.89cm^2/m^2.     LEFT ATRIUM:  The atrium is moderately dilated.     RIGHT VENTRICLE:  The cavity size is normal. Wall thickness is normal.  Systolic function is normal. Pacemaker lead noted in right ventricle.  The RV pressure during systole is 39mm Hg.     VENTRICULAR SEPTUM:    Abnormal septal motion. Septal motion is mildly  dyssynergic. These changes are consistent with right ventricular pacing.     PULMONIC VALVE:   The valve is structurally normal. Cusp separation is normal.  Velocity is within the normal range. There is trivial regurgitation.     TRICUSPID VALVE:  The valve is structurally normal. Leaflet separation is  normal. Inflow velocity is within the normal range. There is mild  regurgitation.     PULMONARY ARTERIES:  The main pulmonary artery is normal-sized. Systolic pressure is mildly  increased, estimated to be 40mm Hg.     RIGHT ATRIUM:  The atrium is normal in size. Pacemaker lead noted in right  atrium.     PERICARDIUM:   There is no pericardial effusion.     SYSTEMIC VEINS:  Inferior vena cava: The IVC is normal-sized.     ------------------------------------------------------------------------------  Measurements     Left ventricle           Value          Ref         Left atrium continued     Value          Ref  RYAN, LAX chord       (N) 4.4   cm       3.8 - 5.2   Vol/bsa, ES, 1-p A2C  (H) 43    ml/m^2   13 - 40  ESD, LAX chord       (N) 3.1   cm       2.2 - 3.5   Vol, ES, 2-p              96    ml       ---------  RYAN/bsa, LAX chord   (L) 1.9   cm/m^2   2.3 - 3.1   Vol/bsa, ES, 2-p      (H) 42    ml/m^2   16 - 34  ESD/bsa, LAX chord   (N) 1.4   cm/m^2   1.3 - 2.1  PW, ED, LAX          (H) 1.0   cm       0.6 - 0.9   Aortic valve              Value          Ref  PW thickening, LAX       50    %        ----------  Leaflet sep, MM           1.2   cm       ---------  IVS, ED              (H) 1.0   cm       0.6 -  0.9   Peak v, S                 1.6   m/sec    ---------  IVS, ES                  1.4   cm       ----------  Mean v, S                 1.08  m/sec    ---------  IVS thickening           40    %        ----------  Mean grad, S              6     mm Hg    ---------  PW, ED               (H) 1.0   cm       0.6 - 0.9   Peak grad, S              11    mm Hg    ---------  PW, ES                   1.5   cm       ----------  LVOT/AV, VTI ratio        0.79           ---------  PW thickening            50    %        ----------  JENNIFER, VTI                  2.6   cm^2     ---------  IVS/PW, ED               1              ----------  JENNIFER/bsa, VTI              1.15  cm^2/m^2 ---------  EDV                  (N) 85    ml       46 - 106    LVOT/AV, Vpeak ratio      0.87           ---------  ESV                  (N) 30    ml       14 - 42     JENNIFER, Vmax                 2.7   cm^2     ---------  EF                   (N) 60    %        54 - 74     JENNIFER/bsa, Vmax             1.19  cm^2/m^2 ---------  SV                       50    ml       ----------  AR ED v                   2.99  m/s      ---------  EDV/bsa              (N) 37    ml/m^2   29 - 61     AR decel                  151   cm/s^2   ---------  ESV/bsa              (N) 13    ml/m^2   8 - 24      AR PHT                    579   ms       ---------  SV/bsa                   22    ml/m^2   ----------  AR ED grad                36    mm Hg    ---------  E', lat maryjane, TDI     (L) 5.9   cm/sec   >=10.0  E/e', lat maryjane, TDI   (H) 17             <=13        Mitral valve              Value          Ref  E', med maryjane, TDI     (L) 5.4   cm/sec   >=7.0       Peak E                    0.98  m/sec    ---------  E/e', med maryjane, TDI       18             ----------  Peak A                    1.41  m/sec    ---------  E', heidi, TDI             5.655 cm/sec   ----------  Decel time                271   ms       ---------  E/e', heidi, TDI       (H) 17             <=14        T                       109    ms       ---------  Peak grad, D              4     mm Hg    ---------  LVOT                     Value          Ref         Peak E/A ratio            0.7            ---------  Diam, S                  2.0   cm       ----------  MVA, PHT                  2.0   cm^2     ---------  Area                     3.1   cm^2     ----------  MVA/bsa, PHT              0.89  cm^2/m^2 ---------  Peak jaime, S              1.41  m/sec    ----------  MR peak v                 3.17  m/sec    ---------  Peak grad, S             8     mm Hg    ----------  Peak LV-LA grad S         40    mm Hg    ---------  Max MR v                  2.93  m/sec    ---------  Right ventricle          Value          Ref         Peak LV-LA grad S         34    mm Hg    ---------  RYAN, LAX                 2.9   cm       ----------  TAPSE, MM            (N) 2.0   cm       >=1.7       Tricuspid valve           Value          Ref  Pressure, S              39    mm Hg    ----------  TR peak v             (H) 3     m/sec    <=2.8  S' lateral           (N) 12.1  cm/sec   6.0 - 13.4  Peak RV-RA grad, S        36    mm Hg    ---------     Left atrium              Value          Ref         Aortic root               Value          Ref  AP dim, ES           (N) 3.5   cm       2.7 - 3.8   Root diam, ED         (L) 2.8   cm       2.9 - 4.3  AP dim index, ES     (N) 1.5   cm/m^2   1.5 - 2.3  Area ES, A4C         (H) 27    cm^2     <=20        Ascending aorta           Value          Ref  Area/bsa ES, A4C         11.67 cm^2/m^2 ----------  AAo AP diam, ED       (N) 3.4   cm       1.9 - 3.5  Area ES, A2C             27    cm^2     ----------  AAo AP diam/bsa, ED   (N) 1.5   cm/m^2   1.0 - 2.2  Area/bsa ES, A2C         11.84 cm^2/m^2 ----------  Vol, ES, 1-p A4C     (H) 93    ml       22 - 52     Pulmonary artery          Value          Ref  Vol/bsa, ES, 1-p A4C (H) 41    ml/m^2   11 - 40     Pressure, S               39    mm Hg    ---------  Vol, ES, 1-p A2C     (H) 97     ml       22 - 52  Legend:  (L)  and  (H)  chon values outside specified reference range.     (N)  marks values inside specified reference range.     Prepared and electronically signed by  Alessandro Mendoza MD  03/30/2024 13:15        LAST CT:  === 03/29/24 ===     CTA CHEST PULMONARY EMBOLISM     - Narrative -  EXAM:   CTA CHEST PULMONARY EMBOLISM     CLINICAL INDICATION: Shortness of breath     COMPARISON: 3/28/2024     TECHNIQUE: Helical CT images through the thorax during IV infusion of 80 mL  Omnipaque 350 with multiplanar reconstructions including 3D MIP  reconstructions of vasculature. Automated exposure control employed as  radiation dose reduction strategy on this patient.     FINDINGS:     Heart: Mild cardiomegaly. Pacemaker leads and mitral annulus  calcifications.  Pulmonary arteries: No filling defects to the distal segmental level with  motion artifact limiting evaluation of subsegmental pulmonary artery  branches.  Great vessels: No acute findings.  Mediastinum: Moderate-sized hiatal hernia  Lymph nodes: None enlarged.  Pleural space: No effusion or pneumothorax.  Lungs: Patchy areas of consolidation and groundglass opacity in the lower  lobes with some involvement of the right middle lobe. Mild bronchial wall  thickening in these regions.  Body wall: Dense calcification in the spinal canal at the T6 level  Bony structures: No acute findings.  Upper abdomen: No acute findings     - Impression -  1.   No evidence of acute pulmonary embolism.  2.   Patchy areas of consolidation and groundglass opacity primarily in the  lower lungs suggesting multifocal aspiration or bronchopneumonia.  3.   Dense calcification in the spinal canal at the T6 level. Correlate  with any spinal cord symptoms.  4.   Other findings as above.     Electronically Signed by: JEREMIAH ERIC M.D.  Signed on: 3/29/2024 10:32 PM  Workstation ID: [id number]        === 03/28/24 ===     CT CHEST WO CONTRAST     - Narrative -  EXAM: CT  CHEST WO CONTRAST     CLINICAL INDICATION: Cough.     COMPARISON: Chest x-ray earlier the same day.     TECHNIQUE: Axial images through the chest with multiplanar reconstructions.  Related dose reduction protocol utilized. mA and/or kVp was adjusted for  patient size.     FINDINGS: Heart size is normal. Pacing battery and leads in place. No  pericardial effusion. There is no mediastinal or hilar lymphadenopathy by  size criteria in this unenhanced study. Moderate size hiatal hernia.  Esophagus unremarkable. Mild centrilobular emphysematous changes with upper  lung predominance. Mild geographic mosaic attenuation pattern bilaterally.  5 mm groundglass nodule left apex. 2 mm micronodule right apex laterally.  Bands of atelectasis or scarring both lung bases. No pleural effusion or  pneumothorax. 3 mm micronodule right lung base posteriorly and laterally  series 2 image 988. Other tiny 2 mm micronodules bilaterally. Degenerative  changes thoracic spine.     - Impression -  Few scattered micronodules bilaterally. 5 mm groundglass nodule left apex.  Follow-up CT chest in 6 to 12 months recommended.     Mild centrilobular emphysematous changes with upper lobe lung predominance.  Mild geographic mosaic attenuation pattern which could be seen in small  vessel or airway disease.     Small to moderate hiatal hernia.     Electronically Signed by: IBAN NEGRON M.D.  Signed on: 3/28/2024 7:42 AM  Workstation ID: [id number]     LAST EKG:           Encounter Date: 03/29/24   Electrocardiogram 12-Lead   Result Value     Ventricular Rate EKG/Min (BPM) 110     Atrial Rate (BPM) 110     QRS-Interval (MSEC) 180     QT-Interval (MSEC) 354     QTc 479     R Axis (Degrees) -62     T Axis (Degrees) 100     REPORT TEXT         Atrial-sensed ventricular-paced rhythm  with prolonged AV conduction  Abnormal ECG  When compared with ECG of  28-MAR-2024 04:17,  Vent. rate  has increased  BY  36 BPM  Confirmed by ROBERTH VIGIL MD ([id number]) on  3/30/2024 10:03:30 PM         LAST X-RAY:  === 03/29/24 ===     XR CHEST AP OR PA     - Narrative -  EXAM: XR CHEST AP OR PA     CLINICAL INDICATION: Pneumonia follow-up, lung consolidation     COMPARISON: Chest radiograph April 1, 2024, April 30, 2024     - Impression -  FINDINGS AND IMPRESSION:  Band of consolidation in the infrahilar retrocardiac left lower lobe is  unchanged. Additional ill-defined streaky subsegmental parenchymal airspace  opacities in the infrahilar right lower lobe are also unchanged.     Moderate size hiatal hernia in the inferior mediastinum. No pleural  effusion. No vascular congestion. Chronic cardiomegaly. Pacer leads  unchanged.     Dictated by: KIKA JAQUEZ  Dictated on: 4/4/2024 7:50 AM        IGENO M.D., have reviewed the images and report and concur with  these findings interpreted by KIKA JAQUEZ.     Electronically Signed by: GENO KELLEY M.D.  Signed on: 4/4/2024 8:07 AM  Workstation ID: [id number]     ___________________________________________________________________________     XR CHEST AP OR PA     - Narrative -  EXAM: XR CHEST AP OR PA     CLINICAL INDICATION: Metapneumovirus, right lower lung consolidation     COMPARISON: Chest radiograph March 30, 2024, March 29, 2024, March 28, 2024     - Impression -  FINDINGS AND IMPRESSION:     Left-sided pacemaker with leads terminating in the right ventricle and  right atrium.     The cardiomediastinal silhouette is normal. Mild interval improvement of  linear and patchy opacities throughout the right lung field. Few  patchy/linear opacities in the left lung field appear essentially  unchanged. No significant pleural effusion or pneumothorax. No acute  osseous abnormalities.     Dictated by: KIKA JAQUEZ  Dictated on: 4/2/2024 8:07 AM     ALEJANDRO ALEX M.D., have reviewed the images and report and concur with  these findings interpreted by KIKA JAQUEZ.     Electronically Signed by: ALEJANDRO JEONG  M.D.  Signed on: 4/2/2024 8:45 AM  Workstation ID: [id number]     ___________________________________________________________________________     XR CHEST AP OR PA     - Narrative -  EXAM:  XR CHEST AP OR PA     CLINICAL INDICATION: Hypoxia     COMPARISON: 3/29/2024     - Impression -  FINDINGS/IMPRESSION:     Prominent heart size.     Coarse prominence of bronchovascular and interstitial markings with streaky  ill-defined opacities in lower lung fields improved. The thickened  interlobular septa in lower lung field seen on prior study are no longer  demonstrated. Infectious process not excluded. Other etiology also not  excluded.     Electronically Signed by: SENDY PRINCE M.D.  Signed on: 3/30/2024 12:32 PM  Workstation ID: [id number]    TIME SPENT:  ***    Sissy Olivarez, CNP    bases. No pleural effusion or  pneumothorax. 3 mm micronodule right lung base posteriorly and laterally  series 2 image 988. Other tiny 2 mm micronodules bilaterally. Degenerative  changes thoracic spine.     - Impression -  Few scattered micronodules bilaterally. 5 mm groundglass nodule left apex.  Follow-up CT chest in 6 to 12 months recommended.     Mild centrilobular emphysematous changes with upper lobe lung predominance.  Mild geographic mosaic attenuation pattern which could be seen in small  vessel or airway disease.     Small to moderate hiatal hernia.     Electronically Signed by: IBAN NEGRON M.D.  Signed on: 3/28/2024 7:42 AM  Workstation ID: [id number]     LAST EKG:           Encounter Date: 03/29/24   Electrocardiogram 12-Lead   Result Value     Ventricular Rate EKG/Min (BPM) 110     Atrial Rate (BPM) 110     QRS-Interval (MSEC) 180     QT-Interval (MSEC) 354     QTc 479     R Axis (Degrees) -62     T Axis (Degrees) 100     REPORT TEXT         Atrial-sensed ventricular-paced rhythm  with prolonged AV conduction  Abnormal ECG  When compared with ECG of  28-MAR-2024 04:17,  Vent. rate  has increased  BY  36 BPM  Confirmed by ROBERTH VIGIL MD ([id number]) on 3/30/2024 10:03:30 PM         LAST X-RAY:  === 03/29/24 ===     XR CHEST AP OR PA     - Narrative -  EXAM: XR CHEST AP OR PA     CLINICAL INDICATION: Pneumonia follow-up, lung consolidation     COMPARISON: Chest radiograph April 1, 2024, April 30, 2024     - Impression -  FINDINGS AND IMPRESSION:  Band of consolidation in the infrahilar retrocardiac left lower lobe is  unchanged. Additional ill-defined streaky subsegmental parenchymal airspace  opacities in the infrahilar right lower lobe are also unchanged.     Moderate size hiatal hernia in the inferior mediastinum. No pleural  effusion. No vascular congestion. Chronic cardiomegaly. Pacer leads  unchanged.     Dictated by: KIKA JAQUEZ  Dictated on: 4/4/2024 7:50 AM        GENO ALEX M.D.,  have reviewed the images and report and concur with  these findings interpreted by KIKA JAQUEZ.     Electronically Signed by: GENO KELLEY M.D.  Signed on: 4/4/2024 8:07 AM  Workstation ID: [id number]     ___________________________________________________________________________     XR CHEST AP OR PA     - Narrative -  EXAM: XR CHEST AP OR PA     CLINICAL INDICATION: Metapneumovirus, right lower lung consolidation     COMPARISON: Chest radiograph March 30, 2024, March 29, 2024, March 28, 2024     - Impression -  FINDINGS AND IMPRESSION:     Left-sided pacemaker with leads terminating in the right ventricle and  right atrium.     The cardiomediastinal silhouette is normal. Mild interval improvement of  linear and patchy opacities throughout the right lung field. Few  patchy/linear opacities in the left lung field appear essentially  unchanged. No significant pleural effusion or pneumothorax. No acute  osseous abnormalities.     Dictated by: KIKA JAQUEZ  Dictated on: 4/2/2024 8:07 AM     IALEJANDRO M.D., have reviewed the images and report and concur with  these findings interpreted by KIKA JAQUEZ.     Electronically Signed by: ALEJANDRO JEONG M.D.  Signed on: 4/2/2024 8:45 AM  Workstation ID: [id number]     ___________________________________________________________________________     XR CHEST AP OR PA     - Narrative -  EXAM:  XR CHEST AP OR PA     CLINICAL INDICATION: Hypoxia     COMPARISON: 3/29/2024     - Impression -  FINDINGS/IMPRESSION:     Prominent heart size.     Coarse prominence of bronchovascular and interstitial markings with streaky  ill-defined opacities in lower lung fields improved. The thickened  interlobular septa in lower lung field seen on prior study are no longer  demonstrated. Infectious process not excluded. Other etiology also not  excluded.     Electronically Signed by: SENDY PRINCE M.D.  Signed on: 3/30/2024 12:32 PM  Workstation ID: [id number]    TIME  SPENT: 30 minutes    Sissy Olivarez CNP     Discussed patient case, complete plan of care, and all decision making with Dr. Park

## 2025-02-06 ENCOUNTER — HOSPITAL ENCOUNTER (OUTPATIENT)
Dept: CT IMAGING | Age: 30
Discharge: HOME | End: 2025-02-06
Payer: MEDICAID

## 2025-02-06 DIAGNOSIS — R10.9 FLANK PAIN: ICD-10-CM

## 2025-02-06 PROCEDURE — 74176 CT ABD & PELVIS W/O CONTRAST: CPT

## (undated) DEVICE — GARMENT COMPR STD FOR 17IN CALF UNIF THER FLOTRN

## (undated) DEVICE — HYPODERMIC SAFETY NEEDLE: Brand: MAGELLAN

## (undated) DEVICE — TISSUE RETRIEVAL SYSTEM: Brand: INZII RETRIEVAL SYSTEM

## (undated) DEVICE — SOLUTION IV IRRIG POUR BRL 0.9% SODIUM CHL 2F7124

## (undated) DEVICE — SYRINGE MED 10ML LUERLOCK TIP W/O SFTY DISP

## (undated) DEVICE — CUTTER ENDOSCP L340MM LIN ARTC SGL STROKE FIRING ENDOPATH

## (undated) DEVICE — DBD-DRAPE,LAP,CHOLE,W/TROUGHS,STERILE: Brand: MEDLINE

## (undated) DEVICE — ADHESIVE SKIN CLOSURE TOP 36 CC HI VISC DERMBND MINI

## (undated) DEVICE — SHEET,DRAPE,53X77,STERILE: Brand: MEDLINE

## (undated) DEVICE — SEALER LAP L37CM MARYLAND JAW OPN NANO COAT MULTIFUNCTIONAL

## (undated) DEVICE — GLOVE ORANGE PI 7 1/2   MSG9075

## (undated) DEVICE — GOWN,AURORA,NONREINF,RAGLAN,XXL,STERILE: Brand: MEDLINE

## (undated) DEVICE — MINOR SET UP: Brand: MEDLINE INDUSTRIES, INC.

## (undated) DEVICE — TROCAR: Brand: KII SLEEVE

## (undated) DEVICE — CANISTER, RIGID, 1200CC: Brand: MEDLINE INDUSTRIES, INC.

## (undated) DEVICE — GENERAL LAPAROSCOPY: Brand: MEDLINE INDUSTRIES, INC.

## (undated) DEVICE — SUTURE VCRL SZ 4-0 L18IN ABSRB UD L19MM PS-2 3/8 CIR PRIM J496H

## (undated) DEVICE — PMI DISPOSABLE PUNCTURE CLOSURE DEVICE / SUTURE GRASPER: Brand: PMI

## (undated) DEVICE — TUBING, SUCTION, 1/4" X 12', STRAIGHT: Brand: MEDLINE

## (undated) DEVICE — INTENDED FOR TISSUE SEPARATION, AND OTHER PROCEDURES THAT REQUIRE A SHARP SURGICAL BLADE TO PUNCTURE OR CUT.: Brand: BARD-PARKER ® STAINLESS STEEL BLADES

## (undated) DEVICE — TROCAR: Brand: KII SHIELDED BLADED ACCESS SYSTEM

## (undated) DEVICE — GLOVE SURG SZ 8 L12IN FNGR THK79MIL GRN LTX FREE

## (undated) DEVICE — INSUFFLATION NEEDLE TO ESTABLISH PNEUMOPERITONEUM.: Brand: INSUFFLATION NEEDLE

## (undated) DEVICE — COVER LT HNDL BLU PLAS

## (undated) DEVICE — DRAPE,T,LAPARO,TRANS,STERILE: Brand: MEDLINE

## (undated) DEVICE — SET INSUF TUBE HEAT ISO CONN DISP

## (undated) DEVICE — RELOAD STPL SZ 0 L45MM DIA3.5MM 0DEG STD REG TISS BLU TI